# Patient Record
Sex: FEMALE | Race: WHITE | NOT HISPANIC OR LATINO | Employment: FULL TIME | URBAN - METROPOLITAN AREA
[De-identification: names, ages, dates, MRNs, and addresses within clinical notes are randomized per-mention and may not be internally consistent; named-entity substitution may affect disease eponyms.]

---

## 2020-07-02 ENCOUNTER — RECORDS - HEALTHEAST (OUTPATIENT)
Dept: LAB | Facility: CLINIC | Age: 50
End: 2020-07-02

## 2020-07-02 LAB
ALBUMIN SERPL-MCNC: 4.1 G/DL (ref 3.5–5)
ALP SERPL-CCNC: 75 U/L (ref 45–120)
ALT SERPL W P-5'-P-CCNC: 37 U/L (ref 0–45)
ANION GAP SERPL CALCULATED.3IONS-SCNC: 14 MMOL/L (ref 5–18)
AST SERPL W P-5'-P-CCNC: 51 U/L (ref 0–40)
BILIRUB SERPL-MCNC: 0.3 MG/DL (ref 0–1)
BUN SERPL-MCNC: 8 MG/DL (ref 8–22)
CALCIUM SERPL-MCNC: 10.3 MG/DL (ref 8.5–10.5)
CHLORIDE BLD-SCNC: 93 MMOL/L (ref 98–107)
CO2 SERPL-SCNC: 26 MMOL/L (ref 22–31)
CREAT SERPL-MCNC: 0.97 MG/DL (ref 0.6–1.1)
GFR SERPL CREATININE-BSD FRML MDRD: >60 ML/MIN/1.73M2
GLUCOSE BLD-MCNC: 187 MG/DL (ref 70–125)
POTASSIUM BLD-SCNC: 3.5 MMOL/L (ref 3.5–5)
PROT SERPL-MCNC: 7.1 G/DL (ref 6–8)
SODIUM SERPL-SCNC: 133 MMOL/L (ref 136–145)
TSH SERPL DL<=0.005 MIU/L-ACNC: 1.87 UIU/ML (ref 0.3–5)
VIT B12 SERPL-MCNC: 187 PG/ML (ref 213–816)

## 2020-09-16 ENCOUNTER — RECORDS - HEALTHEAST (OUTPATIENT)
Dept: LAB | Facility: CLINIC | Age: 50
End: 2020-09-16

## 2020-09-16 LAB — VIT B12 SERPL-MCNC: 540 PG/ML (ref 213–816)

## 2021-05-25 ENCOUNTER — RECORDS - HEALTHEAST (OUTPATIENT)
Dept: ADMINISTRATIVE | Facility: CLINIC | Age: 51
End: 2021-05-25

## 2021-05-29 ENCOUNTER — RECORDS - HEALTHEAST (OUTPATIENT)
Dept: ADMINISTRATIVE | Facility: CLINIC | Age: 51
End: 2021-05-29

## 2021-05-31 ENCOUNTER — RECORDS - HEALTHEAST (OUTPATIENT)
Dept: ADMINISTRATIVE | Facility: CLINIC | Age: 51
End: 2021-05-31

## 2021-11-16 ENCOUNTER — LAB REQUISITION (OUTPATIENT)
Dept: LAB | Facility: CLINIC | Age: 51
End: 2021-11-16

## 2021-11-16 DIAGNOSIS — Z13.220 ENCOUNTER FOR SCREENING FOR LIPOID DISORDERS: ICD-10-CM

## 2021-11-16 DIAGNOSIS — I10 ESSENTIAL (PRIMARY) HYPERTENSION: ICD-10-CM

## 2021-11-16 DIAGNOSIS — E53.9 VITAMIN B DEFICIENCY, UNSPECIFIED: ICD-10-CM

## 2021-11-16 LAB
ALBUMIN SERPL-MCNC: 4 G/DL (ref 3.5–5)
ALP SERPL-CCNC: 81 U/L (ref 45–120)
ALT SERPL W P-5'-P-CCNC: 36 U/L (ref 0–45)
ANION GAP SERPL CALCULATED.3IONS-SCNC: 13 MMOL/L (ref 5–18)
AST SERPL W P-5'-P-CCNC: 33 U/L (ref 0–40)
BILIRUB SERPL-MCNC: 0.4 MG/DL (ref 0–1)
BUN SERPL-MCNC: 5 MG/DL (ref 8–22)
CALCIUM SERPL-MCNC: 10.3 MG/DL (ref 8.5–10.5)
CHLORIDE BLD-SCNC: 94 MMOL/L (ref 98–107)
CHOLEST SERPL-MCNC: 125 MG/DL
CO2 SERPL-SCNC: 28 MMOL/L (ref 22–31)
CREAT SERPL-MCNC: 0.73 MG/DL (ref 0.6–1.1)
GFR SERPL CREATININE-BSD FRML MDRD: >90 ML/MIN/1.73M2
GLUCOSE BLD-MCNC: 144 MG/DL (ref 70–125)
HDLC SERPL-MCNC: 54 MG/DL
LDLC SERPL CALC-MCNC: 56 MG/DL
POTASSIUM BLD-SCNC: 4.2 MMOL/L (ref 3.5–5)
PROT SERPL-MCNC: 7.2 G/DL (ref 6–8)
SODIUM SERPL-SCNC: 135 MMOL/L (ref 136–145)
TRIGL SERPL-MCNC: 75 MG/DL
VIT B12 SERPL-MCNC: 780 PG/ML (ref 213–816)

## 2021-11-16 PROCEDURE — 82607 VITAMIN B-12: CPT | Performed by: NURSE PRACTITIONER

## 2021-11-16 PROCEDURE — 80053 COMPREHEN METABOLIC PANEL: CPT | Performed by: NURSE PRACTITIONER

## 2021-11-16 PROCEDURE — 80061 LIPID PANEL: CPT | Performed by: NURSE PRACTITIONER

## 2022-06-15 ENCOUNTER — LAB REQUISITION (OUTPATIENT)
Dept: LAB | Facility: CLINIC | Age: 52
End: 2022-06-15

## 2022-06-15 DIAGNOSIS — I10 ESSENTIAL (PRIMARY) HYPERTENSION: ICD-10-CM

## 2022-06-15 LAB
ALBUMIN SERPL-MCNC: 4.5 G/DL (ref 3.5–5)
ALP SERPL-CCNC: 67 U/L (ref 45–120)
ALT SERPL W P-5'-P-CCNC: 28 U/L (ref 0–45)
ANION GAP SERPL CALCULATED.3IONS-SCNC: 14 MMOL/L (ref 5–18)
AST SERPL W P-5'-P-CCNC: 26 U/L (ref 0–40)
BILIRUB SERPL-MCNC: 0.7 MG/DL (ref 0–1)
BUN SERPL-MCNC: 6 MG/DL (ref 8–22)
CALCIUM SERPL-MCNC: 10.7 MG/DL (ref 8.5–10.5)
CHLORIDE BLD-SCNC: 93 MMOL/L (ref 98–107)
CO2 SERPL-SCNC: 28 MMOL/L (ref 22–31)
CREAT SERPL-MCNC: 0.75 MG/DL (ref 0.6–1.1)
GFR SERPL CREATININE-BSD FRML MDRD: >90 ML/MIN/1.73M2
GLUCOSE BLD-MCNC: 127 MG/DL (ref 70–125)
POTASSIUM BLD-SCNC: 3.6 MMOL/L (ref 3.5–5)
PROT SERPL-MCNC: 7.7 G/DL (ref 6–8)
SODIUM SERPL-SCNC: 135 MMOL/L (ref 136–145)

## 2022-06-15 PROCEDURE — 80053 COMPREHEN METABOLIC PANEL: CPT | Performed by: NURSE PRACTITIONER

## 2022-06-24 ENCOUNTER — LAB REQUISITION (OUTPATIENT)
Dept: LAB | Facility: CLINIC | Age: 52
End: 2022-06-24

## 2022-06-24 DIAGNOSIS — I10 ESSENTIAL (PRIMARY) HYPERTENSION: ICD-10-CM

## 2022-06-24 LAB
CALCIUM SERPL-MCNC: 10.4 MG/DL (ref 8.5–10.5)
PTH-INTACT SERPL-MCNC: 25 PG/ML (ref 10–86)

## 2022-06-24 PROCEDURE — 83970 ASSAY OF PARATHORMONE: CPT | Performed by: NURSE PRACTITIONER

## 2022-06-24 PROCEDURE — 82310 ASSAY OF CALCIUM: CPT | Performed by: NURSE PRACTITIONER

## 2022-10-25 ENCOUNTER — LAB REQUISITION (OUTPATIENT)
Dept: LAB | Facility: CLINIC | Age: 52
End: 2022-10-25

## 2022-10-25 DIAGNOSIS — I10 ESSENTIAL (PRIMARY) HYPERTENSION: ICD-10-CM

## 2022-10-25 DIAGNOSIS — Z13.220 ENCOUNTER FOR SCREENING FOR LIPOID DISORDERS: ICD-10-CM

## 2022-10-25 PROCEDURE — 80048 BASIC METABOLIC PNL TOTAL CA: CPT | Performed by: NURSE PRACTITIONER

## 2022-10-25 PROCEDURE — 80061 LIPID PANEL: CPT | Performed by: NURSE PRACTITIONER

## 2022-10-26 LAB
ANION GAP SERPL CALCULATED.3IONS-SCNC: 19 MMOL/L (ref 7–15)
BUN SERPL-MCNC: 7.3 MG/DL (ref 6–20)
CALCIUM SERPL-MCNC: 10.3 MG/DL (ref 8.6–10)
CHLORIDE SERPL-SCNC: 93 MMOL/L (ref 98–107)
CHOLEST SERPL-MCNC: 136 MG/DL
CREAT SERPL-MCNC: 0.73 MG/DL (ref 0.51–0.95)
DEPRECATED HCO3 PLAS-SCNC: 23 MMOL/L (ref 22–29)
GFR SERPL CREATININE-BSD FRML MDRD: >90 ML/MIN/1.73M2
GLUCOSE SERPL-MCNC: 144 MG/DL (ref 70–99)
HDLC SERPL-MCNC: 52 MG/DL
LDLC SERPL CALC-MCNC: 55 MG/DL
NONHDLC SERPL-MCNC: 84 MG/DL
POTASSIUM SERPL-SCNC: 4.4 MMOL/L (ref 3.4–5.3)
SODIUM SERPL-SCNC: 135 MMOL/L (ref 136–145)
TRIGL SERPL-MCNC: 146 MG/DL

## 2023-02-13 ENCOUNTER — LAB REQUISITION (OUTPATIENT)
Dept: LAB | Facility: CLINIC | Age: 53
End: 2023-02-13

## 2023-02-13 DIAGNOSIS — J02.9 ACUTE PHARYNGITIS, UNSPECIFIED: ICD-10-CM

## 2023-02-13 PROCEDURE — 87081 CULTURE SCREEN ONLY: CPT | Performed by: FAMILY MEDICINE

## 2023-02-15 LAB — BACTERIA SPEC CULT: NORMAL

## 2023-06-14 ENCOUNTER — LAB REQUISITION (OUTPATIENT)
Dept: LAB | Facility: CLINIC | Age: 53
End: 2023-06-14

## 2023-06-14 DIAGNOSIS — R00.0 TACHYCARDIA, UNSPECIFIED: ICD-10-CM

## 2023-06-14 LAB
ERYTHROCYTE [DISTWIDTH] IN BLOOD BY AUTOMATED COUNT: 12.8 % (ref 10–15)
HCT VFR BLD AUTO: 40.6 % (ref 35–47)
HGB BLD-MCNC: 14 G/DL (ref 11.7–15.7)
MCH RBC QN AUTO: 33.3 PG (ref 26.5–33)
MCHC RBC AUTO-ENTMCNC: 34.5 G/DL (ref 31.5–36.5)
MCV RBC AUTO: 97 FL (ref 78–100)
PLATELET # BLD AUTO: 194 10E3/UL (ref 150–450)
RBC # BLD AUTO: 4.2 10E6/UL (ref 3.8–5.2)
TSH SERPL DL<=0.005 MIU/L-ACNC: 1.29 UIU/ML (ref 0.3–4.2)
WBC # BLD AUTO: 4.6 10E3/UL (ref 4–11)

## 2023-06-14 PROCEDURE — 85027 COMPLETE CBC AUTOMATED: CPT | Performed by: NURSE PRACTITIONER

## 2023-06-14 PROCEDURE — 84443 ASSAY THYROID STIM HORMONE: CPT | Performed by: NURSE PRACTITIONER

## 2023-07-03 ENCOUNTER — LAB REQUISITION (OUTPATIENT)
Dept: LAB | Facility: CLINIC | Age: 53
End: 2023-07-03

## 2023-07-03 DIAGNOSIS — I10 ESSENTIAL (PRIMARY) HYPERTENSION: ICD-10-CM

## 2023-07-03 PROCEDURE — 80048 BASIC METABOLIC PNL TOTAL CA: CPT | Performed by: NURSE PRACTITIONER

## 2023-07-04 LAB
ANION GAP SERPL CALCULATED.3IONS-SCNC: 16 MMOL/L (ref 7–15)
BUN SERPL-MCNC: 15.4 MG/DL (ref 6–20)
CALCIUM SERPL-MCNC: 10.5 MG/DL (ref 8.6–10)
CHLORIDE SERPL-SCNC: 82 MMOL/L (ref 98–107)
CREAT SERPL-MCNC: 1.2 MG/DL (ref 0.51–0.95)
DEPRECATED HCO3 PLAS-SCNC: 26 MMOL/L (ref 22–29)
GFR SERPL CREATININE-BSD FRML MDRD: 54 ML/MIN/1.73M2
GLUCOSE SERPL-MCNC: 168 MG/DL (ref 70–99)
POTASSIUM SERPL-SCNC: 3.7 MMOL/L (ref 3.4–5.3)
SODIUM SERPL-SCNC: 124 MMOL/L (ref 136–145)

## 2023-07-13 ENCOUNTER — LAB REQUISITION (OUTPATIENT)
Dept: LAB | Facility: CLINIC | Age: 53
End: 2023-07-13

## 2023-07-13 DIAGNOSIS — I10 ESSENTIAL (PRIMARY) HYPERTENSION: ICD-10-CM

## 2023-07-13 LAB
ANION GAP SERPL CALCULATED.3IONS-SCNC: 15 MMOL/L (ref 7–15)
BUN SERPL-MCNC: 6.6 MG/DL (ref 6–20)
CALCIUM SERPL-MCNC: 11 MG/DL (ref 8.6–10)
CHLORIDE SERPL-SCNC: 78 MMOL/L (ref 98–107)
CREAT SERPL-MCNC: 0.78 MG/DL (ref 0.51–0.95)
DEPRECATED HCO3 PLAS-SCNC: 30 MMOL/L (ref 22–29)
GFR SERPL CREATININE-BSD FRML MDRD: 90 ML/MIN/1.73M2
GLUCOSE SERPL-MCNC: 137 MG/DL (ref 70–99)
POTASSIUM SERPL-SCNC: 3.6 MMOL/L (ref 3.4–5.3)
SODIUM SERPL-SCNC: 123 MMOL/L (ref 136–145)

## 2023-07-13 PROCEDURE — 80048 BASIC METABOLIC PNL TOTAL CA: CPT | Performed by: NURSE PRACTITIONER

## 2023-09-19 ENCOUNTER — LAB REQUISITION (OUTPATIENT)
Dept: LAB | Facility: CLINIC | Age: 53
End: 2023-09-19

## 2023-09-19 DIAGNOSIS — I10 ESSENTIAL (PRIMARY) HYPERTENSION: ICD-10-CM

## 2023-09-19 DIAGNOSIS — E78.2 MIXED HYPERLIPIDEMIA: ICD-10-CM

## 2023-09-19 PROCEDURE — 80048 BASIC METABOLIC PNL TOTAL CA: CPT | Performed by: FAMILY MEDICINE

## 2023-09-19 PROCEDURE — 80061 LIPID PANEL: CPT | Performed by: FAMILY MEDICINE

## 2023-09-20 LAB
ANION GAP SERPL CALCULATED.3IONS-SCNC: 14 MMOL/L (ref 7–15)
BUN SERPL-MCNC: 11.2 MG/DL (ref 6–20)
CALCIUM SERPL-MCNC: 10 MG/DL (ref 8.6–10)
CHLORIDE SERPL-SCNC: 87 MMOL/L (ref 98–107)
CHOLEST SERPL-MCNC: 189 MG/DL
CREAT SERPL-MCNC: 0.83 MG/DL (ref 0.51–0.95)
DEPRECATED HCO3 PLAS-SCNC: 27 MMOL/L (ref 22–29)
EGFRCR SERPLBLD CKD-EPI 2021: 83 ML/MIN/1.73M2
GLUCOSE SERPL-MCNC: 154 MG/DL (ref 70–99)
HDLC SERPL-MCNC: 62 MG/DL
LDLC SERPL CALC-MCNC: 102 MG/DL
NONHDLC SERPL-MCNC: 127 MG/DL
POTASSIUM SERPL-SCNC: 3.6 MMOL/L (ref 3.4–5.3)
SODIUM SERPL-SCNC: 128 MMOL/L (ref 136–145)
TRIGL SERPL-MCNC: 127 MG/DL

## 2024-02-21 ENCOUNTER — TRANSFERRED RECORDS (OUTPATIENT)
Dept: HEALTH INFORMATION MANAGEMENT | Facility: CLINIC | Age: 54
End: 2024-02-21
Payer: COMMERCIAL

## 2024-02-27 ENCOUNTER — LAB REQUISITION (OUTPATIENT)
Dept: LAB | Facility: CLINIC | Age: 54
End: 2024-02-27

## 2024-02-27 DIAGNOSIS — E11.9 TYPE 2 DIABETES MELLITUS WITHOUT COMPLICATIONS (H): ICD-10-CM

## 2024-02-27 DIAGNOSIS — E78.2 MIXED HYPERLIPIDEMIA: ICD-10-CM

## 2024-02-27 LAB
ERYTHROCYTE [DISTWIDTH] IN BLOOD BY AUTOMATED COUNT: 12.6 % (ref 10–15)
HCT VFR BLD AUTO: 43.3 % (ref 35–47)
HGB BLD-MCNC: 15 G/DL (ref 11.7–15.7)
MCH RBC QN AUTO: 33.6 PG (ref 26.5–33)
MCHC RBC AUTO-ENTMCNC: 34.6 G/DL (ref 31.5–36.5)
MCV RBC AUTO: 97 FL (ref 78–100)
PLATELET # BLD AUTO: 160 10E3/UL (ref 150–450)
RBC # BLD AUTO: 4.47 10E6/UL (ref 3.8–5.2)
WBC # BLD AUTO: 6 10E3/UL (ref 4–11)

## 2024-02-27 PROCEDURE — 80048 BASIC METABOLIC PNL TOTAL CA: CPT | Performed by: FAMILY MEDICINE

## 2024-02-27 PROCEDURE — 85027 COMPLETE CBC AUTOMATED: CPT | Performed by: FAMILY MEDICINE

## 2024-02-27 PROCEDURE — 80061 LIPID PANEL: CPT | Performed by: FAMILY MEDICINE

## 2024-02-28 ENCOUNTER — TRANSFERRED RECORDS (OUTPATIENT)
Dept: HEALTH INFORMATION MANAGEMENT | Facility: CLINIC | Age: 54
End: 2024-02-28

## 2024-02-28 LAB
ANION GAP SERPL CALCULATED.3IONS-SCNC: 15 MMOL/L (ref 7–15)
BUN SERPL-MCNC: 11.6 MG/DL (ref 6–20)
CALCIUM SERPL-MCNC: 10.4 MG/DL (ref 8.6–10)
CHLORIDE SERPL-SCNC: 94 MMOL/L (ref 98–107)
CHOLEST SERPL-MCNC: 243 MG/DL
CREAT SERPL-MCNC: 0.81 MG/DL (ref 0.51–0.95)
DEPRECATED HCO3 PLAS-SCNC: 25 MMOL/L (ref 22–29)
EGFRCR SERPLBLD CKD-EPI 2021: 86 ML/MIN/1.73M2
FASTING STATUS PATIENT QL REPORTED: ABNORMAL
GLUCOSE SERPL-MCNC: 181 MG/DL (ref 70–99)
HDLC SERPL-MCNC: 56 MG/DL
LDLC SERPL CALC-MCNC: 160 MG/DL
NONHDLC SERPL-MCNC: 187 MG/DL
POTASSIUM SERPL-SCNC: 3.9 MMOL/L (ref 3.4–5.3)
SODIUM SERPL-SCNC: 134 MMOL/L (ref 135–145)
TRIGL SERPL-MCNC: 135 MG/DL

## 2024-03-21 ENCOUNTER — TRANSCRIBE ORDERS (OUTPATIENT)
Dept: OTHER | Age: 54
End: 2024-03-21

## 2024-03-21 ENCOUNTER — PATIENT OUTREACH (OUTPATIENT)
Dept: ONCOLOGY | Facility: CLINIC | Age: 54
End: 2024-03-21
Payer: COMMERCIAL

## 2024-03-21 DIAGNOSIS — C50.412 MALIGNANT NEOPLASM OF UPPER-OUTER QUADRANT OF LEFT BREAST IN FEMALE, ESTROGEN RECEPTOR POSITIVE (H): Primary | ICD-10-CM

## 2024-03-21 DIAGNOSIS — Z17.0 MALIGNANT NEOPLASM OF UPPER-OUTER QUADRANT OF LEFT BREAST IN FEMALE, ESTROGEN RECEPTOR POSITIVE (H): Primary | ICD-10-CM

## 2024-03-21 NOTE — PROGRESS NOTES
New Patient Oncology Nurse Navigator Note     Referring provider: Dr Ghazal Keane     Referring Clinic/Organization: [facility]     Referred to (specialty:) Radiation Oncology     Date Referral Received: March 21, 2024     Evaluation for:  C50.412, Z17.0 (ICD-10-CM) - Malignant neoplasm of upper-outer quadrant of left breast in female, estrogen receptor positive (H)     Clinical History (per Nurse review of records provided):      Patient had bilateral screening mammograms on 1/21/24 and calcifications were identified at the 3 o'clock position at middle depth of the left breast. Diagnostic imaging followed on 1/25. Left Digital Diagnostic Mammogram was performed with spot magnification views. When performed, computer-aided detection was used in the interpretation of this study.   In the left breast at 2:00 middle depth, there are grouped fine linear calcifications measuring 13 x 10 x 8 mm. There is subtle associated distortion in this area. Further evaluation with ultrasound was pursued. Extensive ultrasound of the left lateral breast was pursued by the radiologist and technologist. No definite sonographic correlate was identified for the mammographic finding.     1/25/24 - Case: [id number]     A) LEFT BREAST, UPPER OUTER QUADRANT, 2:00, STEREOTACTIC-GUIDED CORE BIOPSY:   1. Invasive ductal carcinoma with micropapillary features (see comment)      a. Ely grade: II of III; Osman score: 6 of 9      b. Angio-lymphatic invasion: Present      c. Associated DCIS: Present      d. Subtype: Cribriform       e. Grade of DCIS: 2 of 3      f. Calcifications: Present in invasive carcinoma, DCIS, and benign breast tissue      g. Perineural invasion: Present   2. Breast Ancillary Testing:        a. Hormone Receptors:             Estrogen receptor: Positive (99%, strong staining)             Progesterone receptor: Positive (8%, moderate staining)       b. HER2 by IHC: Equivocal (2+ by manual morphometry)          HER2  by FISH: Negative             HER2/CEP17 ratio: 1.21             HER2 signals/cell: 3.37             CEP17 signals/cell: 2.78       c. Ki-67: 14%   Pathology review (as applicable)    2/14/2024 - Patient met with Dr. Ghazal Keane and proceeded with surgery.    2/21/24 - Bilateral breast MRI  IMPRESSION:   1. A small area of linear, nonmass enhancement is noted along the superior/medial margin of the biopsy cavity/hematoma, consistent with the biopsy-proven invasive ductal carcinoma. By MRI, this measures slightly larger than mammogram, with nonmass enhancement measuring 24 x 9 x 6 mm.   2. Small post biopsy hematoma at site of stereotactic biopsy.   3. No suspicious findings for malignancy in the right breast or either axilla.   Right Breast: BI-RADS CATEGORY: 1 -  NEGATIVE.   Left Breast: BI-RADS CATEGORY: 6 - Known Biopsy-Proven Malignancy-Appropriate Action Should Be Taken.   Imaging needed    3/18/24 Case: [id number]  A) LEFT BREAST, LUMPECTOMY:   1. Invasive ductal carcinoma, Osman grade II of III, with micropapillary features       a. Size: 12 mm       b. Core biopsy site is associated with tumor   2. Ductal carcinoma in situ (DCIS), nuclear grade 2, Cribriform and Solid type   3. Margins:       a. Invasive carcinoma is 1 mm from the posterior margin       b. DCIS is 5 mm from the posterior margin   4. Breast Ancillary Testing: Performed on prior case ([id number])       a. Hormone Receptors:             Estrogen receptor: Positive (99%, strong staining)             Progesterone receptor: Positive (8%, moderate staining)       b. HER2 by IHC: Equivocal (2+ by manual morphometry)          HER2 by FISH: Negative             HER2/CEP17 ratio: 1.21             HER2 signals/cell: 3.37             CEP17 signals/cell: 2.78       c. Ki-67: 14%   5. Extensive lymphovascular invasion present     B) LEFT AXILLARY SENTINEL LYMPH NODE, 1, BIOPSY:   1. Positive for malignancy in 1 of 2 lymph nodes (1/2)      a. Size  of largest metastatic focus: 0.23 mm      b. Extracapsular extension: Absent     C) LEFT BREAST, ANTERIOR MARGIN, RE-EXCISION WITH MARGIN EVALUATION:   1. Lymphovascular invasion present   2. Negative for invasive carcinoma and ductal carcinoma in situ     D) LEFT BREAST, LATERAL MARGIN, RE-EXCISION WITH MARGIN EVALUATION: Negative for atypia and malignancy     Pathology review (as applicable)    She is scheduled to see Dr. Caio Colon at [facility] on 4/3/24. Prescription for letrozole already ordered.     Patient resides in [place], [place]    RADIATION ONCOLOGY APPOINTMENT CONFIRMATION FORM faxed to referring provider's office on 3/22 at 14:22    3/22 11:09 - Telephoned and spoke with patient to assist in scheduling radiation oncology consult. Explained my role and purpose for the call. She will be seeing medical oncology on 4/3. No reexcision or further surgery is planned. Writer received referral, reviewed for appropriate plan, and call warm transferred to New Patient Scheduling for completion.    Records Location: Care Everywhere, Media, and See Bookmarked material    Records Needed:  Path reports-biopsy and surgery (as applicable)  Pathology reviews (as applicable)  Med onc notes, OP note, surgeons note (as applicable)  Genetic results (as applicable)  Echo or MUGA results (as applicable)  Chemotherapy summary(as applicable)  Breast imaging for past 5 years No imaging on PACS as of 3/21

## 2024-03-22 ENCOUNTER — PRE VISIT (OUTPATIENT)
Dept: RADIATION ONCOLOGY | Facility: CLINIC | Age: 54
End: 2024-03-22
Payer: COMMERCIAL

## 2024-03-22 NOTE — TELEPHONE ENCOUNTER
Imaging Received  2024 1:57 PM ABT   Action: Breast Images from [facility] received and email sent to  Imaging team to resolve breast images to PACS.     Records Needed:  Path reports-biopsy and surgery (as applicable)  Pathology reviews (as applicable)  Med onc notes, OP note, surgeons note (as applicable)  Genetic results (as applicable)  Echo or MUGA results (as applicable)  Chemotherapy summary(as applicable)  Breast imaging for past 5 years No imaging on PACS as of 3/21    MEDICAL RECORDS REQUEST   Radiation Oncology  [address] [place] [id number]  Fax: [phone]          FUTURE VISIT INFORMATION                                                   Marlyn GEETA Kemp, : 1970 scheduled for future visit at [facility] Radiation Oncology    RECORDS REQUESTED FOR VISIT                                                     BREAST     OFFICE NOTE from surgeon/plastic surgeon -[facility]: Dr. Ghazal Keane   CHEMOTHERAPY NOTES/LOG     OPERATIVE/BREAST BIOPSY REPORTS [facility]: Left breast lumpectomy, sentinel lymph node biopsy     09: REDUCTION MAMMOPLASTY    MEDICATION LIST [facility]    LABS     PATHOLOGY REPORTS Reports in -[facility]: [id number]  24: [id number]   ANYTHING RELATED TO DIAGNOSIS [facility] Most recent 24   IMAGING (NEED IMAGES & REPORT)     MRI PACS Sub [facility]: MR Breast   MAMMO/SURGICAL BREAST IMGS/SPECIMEN RADIOGRAPH PACS [facility]:  24-20: Mammo  24, 24: XR Breast   ULTRASOUND PACS [facility]:  24-24: US Breast

## 2024-04-08 ENCOUNTER — HOSPITAL ENCOUNTER (OUTPATIENT)
Dept: CT IMAGING | Facility: CLINIC | Age: 54
Discharge: HOME | End: 2024-04-08
Attending: NURSE PRACTITIONER | Admitting: NURSE PRACTITIONER
Payer: COMMERCIAL

## 2024-04-08 DIAGNOSIS — E78.2 MIXED HYPERLIPIDEMIA: ICD-10-CM

## 2024-04-08 LAB
CV CALCIUM SCORE AGATSTON LM: 0
CV CALCIUM SCORING AGATSON LAD: 555
CV CALCIUM SCORING AGATSTON CX: 82
CV CALCIUM SCORING AGATSTON RCA: 598
CV CALCIUM SCORING AGATSTON TOTAL: 1235

## 2024-04-08 PROCEDURE — 75571 CT HRT W/O DYE W/CA TEST: CPT | Mod: 26 | Performed by: STUDENT IN AN ORGANIZED HEALTH CARE EDUCATION/TRAINING PROGRAM

## 2024-04-08 PROCEDURE — 75571 CT HRT W/O DYE W/CA TEST: CPT

## 2024-04-11 ENCOUNTER — OFFICE VISIT (OUTPATIENT)
Dept: RADIATION ONCOLOGY | Facility: HOSPITAL | Age: 54
End: 2024-04-11
Attending: RADIOLOGY
Payer: COMMERCIAL

## 2024-04-11 VITALS
OXYGEN SATURATION: 98 % | SYSTOLIC BLOOD PRESSURE: 132 MMHG | WEIGHT: 189.2 LBS | DIASTOLIC BLOOD PRESSURE: 78 MMHG | BODY MASS INDEX: 28.02 KG/M2 | RESPIRATION RATE: 16 BRPM | TEMPERATURE: 98.4 F | HEART RATE: 81 BPM | HEIGHT: 69 IN

## 2024-04-11 DIAGNOSIS — C50.412 MALIGNANT NEOPLASM OF UPPER-OUTER QUADRANT OF LEFT BREAST IN FEMALE, ESTROGEN RECEPTOR POSITIVE (H): Primary | ICD-10-CM

## 2024-04-11 DIAGNOSIS — Z17.0 MALIGNANT NEOPLASM OF UPPER-OUTER QUADRANT OF LEFT BREAST IN FEMALE, ESTROGEN RECEPTOR POSITIVE (H): Primary | ICD-10-CM

## 2024-04-11 PROCEDURE — 77263 THER RADIOLOGY TX PLNG CPLX: CPT | Performed by: RADIOLOGY

## 2024-04-11 PROCEDURE — 99205 OFFICE O/P NEW HI 60 MIN: CPT | Mod: 25 | Performed by: RADIOLOGY

## 2024-04-11 PROCEDURE — 99214 OFFICE O/P EST MOD 30 MIN: CPT | Performed by: RADIOLOGY

## 2024-04-11 RX ORDER — METFORMIN HYDROCHLORIDE 1000 MG/1
1000 TABLET, FILM COATED, EXTENDED RELEASE ORAL DAILY
COMMUNITY
Start: 2023-09-19

## 2024-04-11 RX ORDER — IVERMECTIN 10 MG/G
CREAM TOPICAL DAILY
COMMUNITY
Start: 2024-03-15

## 2024-04-11 RX ORDER — ATORVASTATIN CALCIUM 40 MG/1
1 TABLET, FILM COATED ORAL AT BEDTIME
COMMUNITY

## 2024-04-11 RX ORDER — AZELAIC ACID 0.15 G/G
GEL TOPICAL 2 TIMES DAILY
COMMUNITY

## 2024-04-11 RX ORDER — ONDANSETRON 4 MG/1
1 TABLET, FILM COATED ORAL EVERY 6 HOURS PRN
COMMUNITY
Start: 2024-04-01 | End: 2024-08-29

## 2024-04-11 RX ORDER — LISINOPRIL 2.5 MG/1
2.5 TABLET ORAL DAILY
COMMUNITY
End: 2024-08-29 | Stop reason: ALTCHOICE

## 2024-04-11 RX ORDER — AMLODIPINE BESYLATE 10 MG/1
10 TABLET ORAL DAILY
COMMUNITY
Start: 2023-09-19

## 2024-04-11 RX ORDER — ACETAMINOPHEN 500 MG
1000 TABLET ORAL
COMMUNITY
Start: 2024-03-18

## 2024-04-11 RX ORDER — METOPROLOL SUCCINATE 50 MG/1
50 TABLET, EXTENDED RELEASE ORAL DAILY
COMMUNITY
Start: 2024-02-27 | End: 2024-08-29 | Stop reason: ALTCHOICE

## 2024-04-11 RX ORDER — LOSARTAN POTASSIUM 100 MG/1
1 TABLET ORAL DAILY
COMMUNITY

## 2024-04-11 RX ORDER — BUPROPION HYDROCHLORIDE 150 MG/1
150 TABLET ORAL EVERY MORNING
COMMUNITY

## 2024-04-11 RX ORDER — LETROZOLE 2.5 MG/1
2.5 TABLET, FILM COATED ORAL DAILY
COMMUNITY
Start: 2024-02-14 | End: 2024-09-24

## 2024-04-11 ASSESSMENT — PAIN SCALES - GENERAL: PAINLEVEL: NO PAIN (0)

## 2024-04-11 NOTE — PROGRESS NOTES
"Considerations for radiation treatment   Pregnancy status: Female with hysterectomy   Implanted Cardiac Devices: No   Any previous radiation therapy: No  Denies any autoimmune diseases      Oncology Rooming Note    April 11, 2024 11:27 AM   Marlyn Martel is a 54 year old female who presents for:    Chief Complaint   Patient presents with    Oncology Clinic Visit    Breast Cancer     Rad Onc consult     Initial Vitals: /78   Pulse 81   Temp 98.4  F (36.9  C)   Resp 16   Ht 1.74 m (5' 8.5\")   Wt 85.8 kg (189 lb 3.2 oz)   SpO2 98%   BMI 28.35 kg/m   Estimated body mass index is 28.35 kg/m  as calculated from the following:    Height as of this encounter: 1.74 m (5' 8.5\").    Weight as of this encounter: 85.8 kg (189 lb 3.2 oz). Body surface area is 2.04 meters squared.  No Pain (0) Comment: Data Unavailable   No LMP recorded.  Allergies reviewed: Yes  Medications reviewed: Yes    Medications: Medication refills not needed today.  Pharmacy name entered into [facility]: [facility] - [place] [id number]    Frailty Screening:   Is the patient here for a new oncology consult visit in cancer care? 2. No      Clinical concerns: Feeling well since surgery, occasional \"zingers\" in breast but tolerable.   Dr. Hickey was notified.      Radiation Therapy Patient Education    Person involved with teaching: Patient    Patient educational needs for self management of treatment-related side effects assessment completed.  [facility] Patient Ed tab contains Patient Learning Assessment    Education Materials Given  Radiation Treatment For Cancer, Radiation Therapy to the Breast Guidelines, Oncology Supportive Care Services , Welcome Letter, Insurance PA Information, and [facility] handout    Educational Topics Discussed  Side effects expected, Skin care, and Activity    Response To Teaching  Verbalizes understanding    GYN Only  Vaginal Dilator-given and educated: N/A    Referrals sent: None    Chemotherapy?  Yes: " unsure yet, states Oncotype pending. She is on Letrazole since 2/15/2024        Paola Melendez RN

## 2024-04-11 NOTE — LETTER
4/11/2024         RE: Marlyn Martel  [address]  [place] [address]        Dear Colleague,    Thank you for referring your patient, Marlyn Martel, to the [facility] RADIATION ONCOLOGY [place]. Please see a copy of my visit note below.    [facility] Radiation Oncology Consult Note     Patient: Marlyn Martel  MRN: [id number]  Date of Service: 04/11/2024          Ghazal Keane MD  [address] [address]  [address]  [place] [address]       Dear Dr. Keane:    Thank you very much for referring this patient for consideration of radiotherapy. As you know Ms. Martel is a 54 year old female with a diagnosis of left breast cancer, pathologic stage [id number](sn)M0, ER/ID positive, HER2 negative, status post lumpectomy and sentinel lymph node resection with negative margin and closest margin measured 1 mm posterior for invasive cancer and a 5 mm posterior for DCIS.  1/2 removed sentinel lymph node showed 0.23 millimeter invasive cancer with no evidence of YUSUF.  Oncotype DX score is pending.  Patient is referred to radiation oncology for evaluation and discussion of possible postop radiation therapy.    HISTORY OF PRESENT ILLNESS:   Ms. Martel is a 54 year old female who has been in her usual state of health until recently.  The patient presented with abnormal finding by routine screening mammogram for which she was not seeking further evaluation.  The mammogram followed by ultrasound showed 13 x 10 x 8 mm removed 5 mm calcification in the left breast at 2:00 middle depth position suspicious for malignancy.  The patient underwent stereotactic breast biopsy on 1/25/2024.  The pathology reviewed invasive ductal carcinoma with some micro papillary features, Osman grade 2, ER/ID positive and HER2 negative.  Patient underwent MRI breast on 2/21/2024 with finding consistent with biopsy-proven breast cancer with no evidence of axillary lymph node adenopathy.  After  discussed with patient about various treatment options, the patient elected proceed with breast preservation protocol as her treatment choice and underwent lumpectomy and sentinel lymph node resection on 3/18/2024.  The pathology showed 12 mm invasive ductal carcinoma, Madison grade 2 with associated DCIS.  The margins was negative with the closest margin measured 1 mm posterior for invasive cancer and a 5 mm posterior for DCIS.  1/2 removed sentinel lymph node showed evidence of 0.23 mm metastatic focus with no evidence of YUSUF.  Her Oncotype DX score is pending.  Patient has been recovering well since surgery.  She saw Dr. Caio Colon, medical oncology and adjuvant hormonal therapy was recommended.  The patient is referred to radiation oncology for evaluation and discussion of postop radiation therapy.    CHEMOTHERAPY HISTORY: Concurrent Chemotherapy: No    RADIATION THERAPY HISTORY: Prior Radiation: No    IMPLANTED CARDIAC DEVICE: none     PREGNANCY: The patient is informed not to be pregnant during the radiation therapy.  She is post menopausal.    Current Outpatient Medications   Medication Sig Dispense Refill     acetaminophen (TYLENOL) 500 MG tablet Take 1,000 mg by mouth       amLODIPine (NORVASC) 10 MG tablet Take 10 mg by mouth daily       letrozole (FEMARA) 2.5 MG tablet Take 2.5 mg by mouth daily       metFORMIN modified (GLUMETZA) 1000 MG 24 hr tablet Take 1,000 mg by mouth daily       metoprolol succinate ER (TOPROL XL) 50 MG 24 hr tablet Take 50 mg by mouth daily       ondansetron (ZOFRAN) 4 MG tablet Take 1 tablet by mouth every 6 hours as needed       SOOLANTRA 1 % cream Apply topically daily       atorvastatin (LIPITOR) 40 MG tablet Take 1 tablet by mouth at bedtime       azelaic acid (FINACIA) 15 % external gel Apply topically 2 times daily       buPROPion (WELLBUTRIN XL) 150 MG 24 hr tablet Take 150 mg by mouth every morning       lisinopril (ZESTRIL) 2.5 MG tablet Take 2.5 mg by mouth daily        losartan (COZAAR) 100 MG tablet Take 1 tablet by mouth daily       Multiple Vitamins-Minerals (MULTIVITAL PO) Take by mouth daily       vitamin B-12 (CYANOCOBALAMIN) 100 MCG tablet Take 100 mcg by mouth daily       Past Medical History:   Diagnosis Date     Breast cancer (H)      Depression      Hypertension      Mixed hyperlipidemia      Past Surgical History:   Procedure Laterality Date     HYSTERECTOMY      age 29     LUMPECTOMY BREAST Left 03/18/2024     [place] REDUCTION OF LARGE BREAST Bilateral      Allergies   Allergen Reactions     Beta Adrenergic Blockers Swelling     edema     Pneumococcal Vaccine Swelling     Sulfa Antibiotics      Family History   Problem Relation Age of Onset     Breast Cancer Mother      Liver Disease Father      Colon Cancer Maternal Grandmother      Leukemia Paternal Grandfather      Cancer Sister      Breast Cancer Maternal Aunt      Cancer Maternal Aunt      Liver Disease Paternal Uncle      Esophageal Cancer Maternal Uncle      Social History     Socioeconomic History     Marital status:      Spouse name: Not on file     Number of children: Not on file     Years of education: Not on file     Highest education level: Not on file   Occupational History     Not on file   Tobacco Use     Smoking status: Every Day     Current packs/day: 0.50     Types: Cigarettes     Smokeless tobacco: Never   Substance and Sexual Activity     Alcohol use: Yes     Comment: holidays     Drug use: Never     Sexual activity: Not on file   Other Topics Concern     Not on file   Social History Narrative     Not on file     Social Determinants of Health     Financial Resource Strain: High Risk (1/1/2022)    [facility]    Financial Resource Strain      Difficulty of Paying Living Expenses: Not on file      Difficulty of Paying Living Expenses: Not on file   Food Insecurity: Not on file   Transportation Needs:  Not on file   Physical Activity: Not on file   Stress: Not on file   Social Connections: Unknown (3/27/2024)    [facility], [facility]    Social Connections      Frequency of Communication with Friends and Family: Not on file   Interpersonal Safety: Not on file   Housing Stability: Not on file        REVIEW OF SYMPTOMS:  A full 14-point review of systems was performed. Pertinent findings are noted in the HPI.    General  Constitutional  Constitutional (WDL): All constitutional elements are within defined limits  EENT  Eye Disorders  Eye Disorder (WDL): All eye disorder elements are within defined limits  Ear Disorders  Ear Disorder (WDL): All ear disorder elements are within defined limits  Respiratory  Respiratory  Respiratory (WDL): All respiratory elements are within defined limits  Cardiovascular  Cardiovascular  Cardiovascular (WDL): All cardiovascular elements are within defined limits  Gastrointestinal  Gastrointestinal  Gastrointestinal (WDL): All gastrointestinal elements are within defined limits  Musculoskeletal  Musculoskeletal and Connective Tissue Disorders  Musculoskeletal & Connective (WDL): All musculoskeletal & connective elements are within defined limits  Integumentary  Integumentary  Integumentary (WDL): All integumentary elements are within defined limits  Neurological  Neurosensory  Neurosensory (WDL): All neurosensory elements are within defined limits  Genitourinary/Reproductive  Genitourinary  Genitourinary (WDL): All genitourinary elements are within defined limits  Lymphatic  Lymph System Disorders  Lymph (WDL): All lymph elements are within defined limits  Pain  Pain Score: No Pain (0)  AUA Assessment                                                              Accompanied by  Accompanied By: self only    ECOG Status: 0    Imaging: Reviewed    Pathology: Reviewed    Objective:      PHYSICAL EXAMINATION:   "  /78   Pulse 81   Temp 98.4  F (36.9  C)   Resp 16   Ht 1.74 m (5' 8.5\")   Wt 85.8 kg (189 lb 3.2 oz)   SpO2 98%   BMI 28.35 kg/m      Gen: Alert, in NAD  Eyes: PERRL, EOMI, sclera anicteric  Neck: Supple, full ROM, no LAD  Chest: The breasts and nipples are symmetrical bilaterally.  There is no evidence of nipple discharge.  There is no palpable lump or mass bilaterally in the breast, axillary, and supraclavicular region.  There is well-healed surgical scar in the left breast consistent with a recent history of surgery.  Pulm: No wheezing, stridor or respiratory distress  CV: Well-perfused, no cyanosis, no pedal edema  Back: No step-offs or pain to palpation along the thoracolumbar spine  Rectal: Deferred  : Deferred  Musculoskeletal: Normal muscle bulk and tone  Skin: Normal color and turgor  Neurologic: A/Ox3, CN II-XII intact, normal gait and station  Psychiatric: Appropriate mood and affect    Intent of Therapy: Curative    We recommend adjuvant radiation as part of her breast conserving therapy to decrease her chance of local recurrence to the single digits. ROM stretches and skin care were discussed with the patient. She understands that these maneuvers need to continue for 6 months following completion of radiation as skin and muscle fibrosis continue to form for weeks to months following completion of therapy.      Side effects that may occur during or within weeks after radiation therapy    Fatigue and general weakness  Darkening, irritation, itchiness, redness, dryness, erythema, peeling, scabbing, ulceration and contraction of the skin of the breast and chest  Swelling of the breast  Loss of armpit hair  Lung irritation  Decrease in appetite    Side effects that may occur months or years after radiation therapy    Development of another tumor or cancer  Thickening, telangiectasias (development of spider like blood vessels in the skin) and ulceration of the skin of the breast and " chest  Firming, fibrosis (scar tissue), fat necrosis, and distortion of the breast  Poor healing after a trauma or surgery in the irradiated area  Nerve damage resulting in loss of arm strength and sensation  Coronary artery blockage causing angina pain or a heart attack  Lung inflammation of fibrosis causing cough, fever and shortness of breath  Fracture of the ribs  Swellingof an arm and hand    The risks, benefits and alternatives to radiation therapy were outlined with the patient. All questions were answered and a consent was signed.     Impression     Left breast cancer, pathologic stage [id number](sn)M0, ER/ND positive, HER2 negative, status post lumpectomy and sentinel lymph node resection with negative margin and closest margin measured 1 mm posterior for invasive cancer and a 5 mm posterior for DCIS.  1/2 removed sentinel lymph node showed 0.23 millimeter invasive cancer with no evidence of YUSUF.  Oncotype DX score is pending.     Assessment & Plan:     I have personally reviewed her upcoming medical record today.  I have also reviewed her most recent radiology study including mammogram.  The possible treatment options including surgery, systemic therapy, and radiation therapy has been discussed with patient in detail and at great lengths.  The possible risks and side effects of radiation therapy has also been explained to the patient.  The NCCN guideline for breast cancer treatment recommendation has also been reviewed with the patient.  Questions are answered to patient satisfaction.    The possibility of participating [facility] MA.39 clinical phase 3 trials has been discussed with patient.  This is a phase 3 clinical trial which randomized patient to have postoperation therapy to the breast/chest wall with or without regional lymph node in the field.    The patient is not interested to participate in clinical trial at this time.  She wished to proceed with postop radiation therapy as her treatment choice for her  "breast cancer.  We will wait for the Oncotype DX score before proceed with radiation therapy.  Patient is informed to contact radiation oncology in the near future to set up time for follow-up and simulation once she had Oncotype DX score.  I plan to give her radiation therapy to a total dose of 4256 cGy in 16 treatments targeted to the left breast and regional lymph nodes using a high tangent technique followed by additional 1000 cGy in 4 treatment boost to the primary tumor bed.    Again, thank you very much for the referral and allowing me to participate in the care of this patient.  If you have any questions or concerns about this consultation, please do not hesitate to call.  I spent approximately 45 minutes today with the patient and 80% time was used for counseling.      Sincerely,          Jackie Hickey MD, PhD  Department of Radiation Oncology   [facility] Radiation Oncology  Tel: [phone]  Cell: [phone]    [facility]  [address] [place] [address]     [facility]   [address] [place] [address]    CC:  Patient Care Team:  No Ref-Primary, Physician as PCP - General  Jackie Hickey MD as MD (Radiation Oncology)        Considerations for radiation treatment   Pregnancy status: Female with hysterectomy   Implanted Cardiac Devices: No   Any previous radiation therapy: No  Denies any autoimmune diseases      Oncology Rooming Note    April 11, 2024 11:27 AM   Marlyn Martel is a 54 year old female who presents for:    Chief Complaint   Patient presents with     Oncology Clinic Visit     Breast Cancer     Rad Onc consult     Initial Vitals: /78   Pulse 81   Temp 98.4  F (36.9  C)   Resp 16   Ht 1.74 m (5' 8.5\")   Wt 85.8 kg (189 lb 3.2 oz)   SpO2 98%   BMI 28.35 kg/m   Estimated body mass index is 28.35 kg/m  as calculated from the following:    Height as of this encounter: 1.74 m (5' 8.5\").    Weight as of this encounter: 85.8 kg (189 lb 3.2 oz). Body surface " "area is 2.04 meters squared.  No Pain (0) Comment: Data Unavailable   No LMP recorded.  Allergies reviewed: Yes  Medications reviewed: Yes    Medications: Medication refills not needed today.  Pharmacy name entered into [facility]: [facility] - [place], [place] - [facility]    Frailty Screening:   Is the patient here for a new oncology consult visit in cancer care? 2. No      Clinical concerns: Feeling well since surgery, occasional \"zingers\" in breast but tolerable.   Dr. Hickey was notified.      Radiation Therapy Patient Education    Person involved with teaching: Patient    Patient educational needs for self management of treatment-related side effects assessment completed.  [facility] Patient Ed tab contains Patient Learning Assessment    Education Materials Given  Radiation Treatment For Cancer, Radiation Therapy to the Breast Guidelines, Oncology Supportive Care Services , Welcome Letter, Insurance PA Information, and [facility] handout    Educational Topics Discussed  Side effects expected, Skin care, and Activity    Response To Teaching  Verbalizes understanding    GYN Only  Vaginal Dilator-given and educated: N/A    Referrals sent: None    Chemotherapy?  Yes: unsure yet, states Oncotype pending. She is on Letrazole since 2/15/2024        Paola Melendez RN                Again, thank you for allowing me to participate in the care of your patient.        Sincerely,        Jackie Hickey MD  "

## 2024-04-11 NOTE — PROGRESS NOTES
[facility] Radiation Oncology Consult Note     Patient: Marlyn Martel  MRN: [id number]  Date of Service: 04/11/2024          Ghazal Keane MD  [id number] [address] [address]       Dear Dr. Keane:    Thank you very much for referring this patient for consideration of radiotherapy. As you know Ms. Martel is a 54 year old female with a diagnosis of left breast cancer, pathologic stage [id number](sn)M0, ER/PA positive, HER2 negative, status post lumpectomy and sentinel lymph node resection with negative margin and closest margin measured 1 mm posterior for invasive cancer and a 5 mm posterior for DCIS.  1/2 removed sentinel lymph node showed 0.23 millimeter invasive cancer with no evidence of YUSUF.  Oncotype DX score is pending.  Patient is referred to radiation oncology for evaluation and discussion of possible postop radiation therapy.    HISTORY OF PRESENT ILLNESS:   Ms. Martel is a 54 year old female who has been in her usual state of health until recently.  The patient presented with abnormal finding by routine screening mammogram for which she was not seeking further evaluation.  The mammogram followed by ultrasound showed 13 x 10 x 8 mm removed 5 mm calcification in the left breast at 2:00 middle depth position suspicious for malignancy.  The patient underwent stereotactic breast biopsy on 1/25/2024.  The pathology reviewed invasive ductal carcinoma with some micro papillary features, Osman grade 2, ER/PA positive and HER2 negative.  Patient underwent MRI breast on 2/21/2024 with finding consistent with biopsy-proven breast cancer with no evidence of axillary lymph node adenopathy.  After discussed with patient about various treatment options, the patient elected proceed with breast preservation protocol as her treatment choice and underwent lumpectomy and sentinel lymph node resection on 3/18/2024.  The pathology showed 12 mm invasive ductal carcinoma,  [place] grade 2 with associated DCIS.  The margins was negative with the closest margin measured 1 mm posterior for invasive cancer and a 5 mm posterior for DCIS.  1/2 removed sentinel lymph node showed evidence of 0.23 mm metastatic focus with no evidence of YUSUF.  Her Oncotype DX score is pending.  Patient has been recovering well since surgery.  She saw Dr. Caio Colon, medical oncology and adjuvant hormonal therapy was recommended.  The patient is referred to radiation oncology for evaluation and discussion of postop radiation therapy.    CHEMOTHERAPY HISTORY: Concurrent Chemotherapy: No    RADIATION THERAPY HISTORY: Prior Radiation: No    IMPLANTED CARDIAC DEVICE: none     PREGNANCY: The patient is informed not to be pregnant during the radiation therapy.  She is post menopausal.    Current Outpatient Medications   Medication Sig Dispense Refill    acetaminophen (TYLENOL) 500 MG tablet Take 1,000 mg by mouth      amLODIPine (NORVASC) 10 MG tablet Take 10 mg by mouth daily      letrozole (FEMARA) 2.5 MG tablet Take 2.5 mg by mouth daily      metFORMIN modified (GLUMETZA) 1000 MG 24 hr tablet Take 1,000 mg by mouth daily      metoprolol succinate ER (TOPROL XL) 50 MG 24 hr tablet Take 50 mg by mouth daily      ondansetron (ZOFRAN) 4 MG tablet Take 1 tablet by mouth every 6 hours as needed      SOOLANTRA 1 % cream Apply topically daily      atorvastatin (LIPITOR) 40 MG tablet Take 1 tablet by mouth at bedtime      azelaic acid (FINACIA) 15 % external gel Apply topically 2 times daily      buPROPion (WELLBUTRIN XL) 150 MG 24 hr tablet Take 150 mg by mouth every morning      lisinopril (ZESTRIL) 2.5 MG tablet Take 2.5 mg by mouth daily      losartan (COZAAR) 100 MG tablet Take 1 tablet by mouth daily      Multiple Vitamins-Minerals (MULTIVITAL PO) Take by mouth daily      vitamin B-12 (CYANOCOBALAMIN) 100 MCG tablet Take 100 mcg by mouth daily       Past Medical History:   Diagnosis Date    Breast cancer (H)      Depression     Hypertension     Mixed hyperlipidemia      Past Surgical History:   Procedure Laterality Date    HYSTERECTOMY      age 29    LUMPECTOMY BREAST Left 03/18/2024    TN REDUCTION OF LARGE BREAST Bilateral      Allergies   Allergen Reactions    Beta Adrenergic Blockers Swelling     edema    Pneumococcal Vaccine Swelling    Sulfa Antibiotics      Family History   Problem Relation Age of Onset    Breast Cancer Mother     Liver Disease Father     Colon Cancer Maternal Grandmother     Leukemia Paternal Grandfather     Cancer Sister     Breast Cancer Maternal Aunt     Cancer Maternal Aunt     Liver Disease Paternal Uncle     Esophageal Cancer Maternal Uncle      Social History     Socioeconomic History    Marital status:      Spouse name: Not on file    Number of children: Not on file    Years of education: Not on file    Highest education level: Not on file   Occupational History    Not on file   Tobacco Use    Smoking status: Every Day     Current packs/day: 0.50     Types: Cigarettes    Smokeless tobacco: Never   Substance and Sexual Activity    Alcohol use: Yes     Comment: holidays    Drug use: Never    Sexual activity: Not on file   Other Topics Concern    Not on file   Social History Narrative    Not on file     Social Determinants of Health     Financial Resource Strain: High Risk (1/1/2022)    [facility]    Financial Resource Strain     Difficulty of Paying Living Expenses: Not on file     Difficulty of Paying Living Expenses: Not on file   Food Insecurity: Not on file   Transportation Needs: Not on file   Physical Activity: Not on file   Stress: Not on file   Social Connections: Unknown (3/27/2024)    [facility]    Social Connections     Frequency of Communication with Friends and Family: Not on file  "  Interpersonal Safety: Not on file   Housing Stability: Not on file        REVIEW OF SYMPTOMS:  A full 14-point review of systems was performed. Pertinent findings are noted in the HPI.    General  Constitutional  Constitutional (WDL): All constitutional elements are within defined limits  EENT  Eye Disorders  Eye Disorder (WDL): All eye disorder elements are within defined limits  Ear Disorders  Ear Disorder (WDL): All ear disorder elements are within defined limits  Respiratory  Respiratory  Respiratory (WDL): All respiratory elements are within defined limits  Cardiovascular  Cardiovascular  Cardiovascular (WDL): All cardiovascular elements are within defined limits  Gastrointestinal  Gastrointestinal  Gastrointestinal (WDL): All gastrointestinal elements are within defined limits  Musculoskeletal  Musculoskeletal and Connective Tissue Disorders  Musculoskeletal & Connective (WDL): All musculoskeletal & connective elements are within defined limits  Integumentary  Integumentary  Integumentary (WDL): All integumentary elements are within defined limits  Neurological  Neurosensory  Neurosensory (WDL): All neurosensory elements are within defined limits  Genitourinary/Reproductive  Genitourinary  Genitourinary (WDL): All genitourinary elements are within defined limits  Lymphatic  Lymph System Disorders  Lymph (WDL): All lymph elements are within defined limits  Pain  Pain Score: No Pain (0)  AUA Assessment                                                              Accompanied by  Accompanied By: self only    ECOG Status: 0    Imaging: Reviewed    Pathology: Reviewed    Objective:      PHYSICAL EXAMINATION:    /78   Pulse 81   Temp 98.4  F (36.9  C)   Resp 16   Ht 1.74 m (5' 8.5\")   Wt 85.8 kg (189 lb 3.2 oz)   SpO2 98%   BMI 28.35 kg/m      Gen: Alert, in NAD  Eyes: PERRL, EOMI, sclera anicteric  Neck: Supple, full ROM, no LAD  Chest: The breasts and nipples are symmetrical bilaterally.  There is no " evidence of nipple discharge.  There is no palpable lump or mass bilaterally in the breast, axillary, and supraclavicular region.  There is well-healed surgical scar in the left breast consistent with a recent history of surgery.  Pulm: No wheezing, stridor or respiratory distress  CV: Well-perfused, no cyanosis, no pedal edema  Back: No step-offs or pain to palpation along the thoracolumbar spine  Rectal: Deferred  : Deferred  Musculoskeletal: Normal muscle bulk and tone  Skin: Normal color and turgor  Neurologic: A/Ox3, CN II-XII intact, normal gait and station  Psychiatric: Appropriate mood and affect    Intent of Therapy: Curative    We recommend adjuvant radiation as part of her breast conserving therapy to decrease her chance of local recurrence to the single digits. ROM stretches and skin care were discussed with the patient. She understands that these maneuvers need to continue for 6 months following completion of radiation as skin and muscle fibrosis continue to form for weeks to months following completion of therapy.      Side effects that may occur during or within weeks after radiation therapy    Fatigue and general weakness  Darkening, irritation, itchiness, redness, dryness, erythema, peeling, scabbing, ulceration and contraction of the skin of the breast and chest  Swelling of the breast  Loss of armpit hair  Lung irritation  Decrease in appetite    Side effects that may occur months or years after radiation therapy    Development of another tumor or cancer  Thickening, telangiectasias (development of spider like blood vessels in the skin) and ulceration of the skin of the breast and chest  Firming, fibrosis (scar tissue), fat necrosis, and distortion of the breast  Poor healing after a trauma or surgery in the irradiated area  Nerve damage resulting in loss of arm strength and sensation  Coronary artery blockage causing angina pain or a heart attack  Lung inflammation of fibrosis causing cough,  fever and shortness of breath  Fracture of the ribs  Swellingof an arm and hand    The risks, benefits and alternatives to radiation therapy were outlined with the patient. All questions were answered and a consent was signed.     Impression     Left breast cancer, pathologic stage [id number](sn)M0, ER/RI positive, HER2 negative, status post lumpectomy and sentinel lymph node resection with negative margin and closest margin measured 1 mm posterior for invasive cancer and a 5 mm posterior for DCIS.  1/2 removed sentinel lymph node showed 0.23 millimeter invasive cancer with no evidence of YUSUF.  Oncotype DX score is pending.     Assessment & Plan:     I have personally reviewed her upcoming medical record today.  I have also reviewed her most recent radiology study including mammogram.  The possible treatment options including surgery, systemic therapy, and radiation therapy has been discussed with patient in detail and at great lengths.  The possible risks and side effects of radiation therapy has also been explained to the patient.  The NCCN guideline for breast cancer treatment recommendation has also been reviewed with the patient.  Questions are answered to patient satisfaction.    The possibility of participating [facility] MA.39 clinical phase 3 trials has been discussed with patient.  This is a phase 3 clinical trial which randomized patient to have postoperation therapy to the breast/chest wall with or without regional lymph node in the field.    The patient is not interested to participate in clinical trial at this time.  She wished to proceed with postop radiation therapy as her treatment choice for her breast cancer.  We will wait for the Oncotype DX score before proceed with radiation therapy.  Patient is informed to contact radiation oncology in the near future to set up time for follow-up and simulation once she had Oncotype DX score.  I plan to give her radiation therapy to a total dose of 4256 cGy in 16  treatments targeted to the left breast and regional lymph nodes using a high tangent technique followed by additional 1000 cGy in 4 treatment boost to the primary tumor bed.    Again, thank you very much for the referral and allowing me to participate in the care of this patient.  If you have any questions or concerns about this consultation, please do not hesitate to call.  I spent approximately 45 minutes today with the patient and 80% time was used for counseling.      Sincerely,          Jackie Hickey MD, PhD  Department of Radiation Oncology   [facility] Radiation Oncology  Tel: [phone]  Cell: [phone]    [facility]  [address] [place] [address]     [place] [address] [facility]    [place] [address]    CC:  Patient Care Team:  No Ref-Primary, Physician as PCP - General  Jackie Hickey MD as MD (Radiation Oncology)

## 2024-04-26 ENCOUNTER — TRANSFERRED RECORDS (OUTPATIENT)
Dept: HEALTH INFORMATION MANAGEMENT | Facility: CLINIC | Age: 54
End: 2024-04-26
Payer: COMMERCIAL

## 2024-04-29 ENCOUNTER — ALLIED HEALTH/NURSE VISIT (OUTPATIENT)
Dept: RADIATION ONCOLOGY | Facility: HOSPITAL | Age: 54
End: 2024-04-29
Attending: RADIOLOGY
Payer: COMMERCIAL

## 2024-04-29 PROCEDURE — 77334 RADIATION TREATMENT AID(S): CPT | Performed by: RADIOLOGY

## 2024-04-29 PROCEDURE — 77290 THER RAD SIMULAJ FIELD CPLX: CPT | Mod: 26 | Performed by: RADIOLOGY

## 2024-04-29 PROCEDURE — 77334 RADIATION TREATMENT AID(S): CPT | Mod: 26 | Performed by: RADIOLOGY

## 2024-04-29 PROCEDURE — 77290 THER RAD SIMULAJ FIELD CPLX: CPT | Performed by: RADIOLOGY

## 2024-04-29 NOTE — PROCEDURES
SIMULATION NOTE:       DIAGNOSIS: Left breast cancer, pathologic stage [id number](sn)M0, ER/IN positive, HER2 negative, status post lumpectomy and sentinel lymph node resection with negative margin and closest margin measured 1 mm posterior for invasive cancer and a 5 mm posterior for DCIS.  1/2 removed sentinel lymph node showed 0.23 millimeter invasive cancer with no evidence of YUSUF.  Oncotype DX score is pending.     INDICATION:  Postoperative radiation therapy is recommended for patient as second part of the breast preservation protocol to further reduce the likelihood of cancer recurrence.    CONSENT:  The possible risks and the side effects of radiation therapy have been discussed with patient in detail and at great length.  Questions are answered to patient's satisfaction.  Written consent was obtained.    SIMULATION:  The patient is in a supine position with a wing breast board to help keep the same position during the daily radiation therapy.  Tentative isocenter is set up in the center of the thoracic region.  We will acquire CT information to help us to better locate target and design radiation therapy field.    We are also going to obtain 4-D CT and use respiratory gating (or breath holdidng) technique to help us to reduce the radiation dose to the heart and lung.      BLOCKS:  Custom blocks will be drawn to minimize radiation to normal tissues and to protect normal organs including, but not limited to, lungs, heart, liver, bone and soft tissue.    DOSAGE:  I plan to give her radiation therapy to a total dose of 4256 cGy in 16 treatments targeted to the left breast and regional lymph nodes using a high tangent technique followed by additional 1000 cGy in 4 treatment boost to the primary tumor bed. I will consider to use 3D conformer technique to help us better locate target and to protect normal tissue.      Jackie Hickey MD, PhD  Department of Radiation Oncology   [facility] Radiation Oncology  Cell:  [phone]    [facility]  [address]  [place] [address]     [place] [id number] [facility] [place] [address]

## 2024-04-29 NOTE — PROCEDURES
Clinical Treatment Planning Note    The complex radiotherapy planning will be completed for the patient to plan the treatment for her breast cancer.  The patient had a planning CT earlier today for planning.  The treatment aids were used for planning, including headrest and Wing Board to help keep the same position during the daily radiation therapy.  The therapy planning is necessary to reduce radiotherapy dose to the normal critical organs which are not possible with simple treatment.  In addition, dose to the target and the critical structures requires three-dimensional analysis of the isodose distribution.  The planning will be done to reduce dose to the lungs, spinal cord, liver, and heart.     I will contour the clinical tumor volume  CTV , with expanded volume of planning treatment volume  PTV  on the treatment planning system.  The critical structures will be outlined, including spinal cord, lungs, heart, liver, bone and soft tissue.     Treatment planning will be done on the computer treatment planning system.  The high tangential field will be used to achieve optimal coverage of the target volume.  Dose distribution to the above critical structures will be reviewed.  Isodose distribution along with the X, Y, Z plan will also be reviewed.  Custom blocking will be used to shield normal structures.  The beam's eye views will be reviewed and the digital reconstructed image will be reviewed on the planning software.      The patient will receive 4256 cGy in 16 treatments targeted to the left breast and regional lymph nodes using a high tangent technique followed by additional 1000 cGy in 4 treatment boost to the primary tumor bed.       Jackie Hickey MD, PhD  Department of Radiation Oncology   [facility] Radiation Oncology  Cell: [phone]    [facility]  [address] [place] [address]     [address]   [place] [address]

## 2024-05-06 ENCOUNTER — APPOINTMENT (OUTPATIENT)
Dept: RADIATION ONCOLOGY | Facility: CLINIC | Age: 54
End: 2024-05-06
Attending: RADIOLOGY
Payer: COMMERCIAL

## 2024-05-06 PROCEDURE — 77300 RADIATION THERAPY DOSE PLAN: CPT | Performed by: RADIOLOGY

## 2024-05-06 PROCEDURE — 77334 RADIATION TREATMENT AID(S): CPT | Mod: 26 | Performed by: RADIOLOGY

## 2024-05-06 PROCEDURE — 77295 3-D RADIOTHERAPY PLAN: CPT | Mod: 26 | Performed by: RADIOLOGY

## 2024-05-06 PROCEDURE — 77334 RADIATION TREATMENT AID(S): CPT | Performed by: RADIOLOGY

## 2024-05-06 PROCEDURE — 77300 RADIATION THERAPY DOSE PLAN: CPT | Mod: 26 | Performed by: RADIOLOGY

## 2024-05-06 PROCEDURE — 77295 3-D RADIOTHERAPY PLAN: CPT | Performed by: RADIOLOGY

## 2024-05-08 ENCOUNTER — APPOINTMENT (OUTPATIENT)
Dept: RADIATION ONCOLOGY | Facility: CLINIC | Age: 54
End: 2024-05-08
Attending: RADIOLOGY
Payer: COMMERCIAL

## 2024-05-08 VITALS
TEMPERATURE: 98.2 F | RESPIRATION RATE: 16 BRPM | DIASTOLIC BLOOD PRESSURE: 75 MMHG | BODY MASS INDEX: 28.92 KG/M2 | OXYGEN SATURATION: 97 % | WEIGHT: 193 LBS | HEART RATE: 80 BPM | SYSTOLIC BLOOD PRESSURE: 147 MMHG

## 2024-05-08 DIAGNOSIS — C50.412 MALIGNANT NEOPLASM OF UPPER-OUTER QUADRANT OF LEFT BREAST IN FEMALE, ESTROGEN RECEPTOR POSITIVE (H): Primary | ICD-10-CM

## 2024-05-08 DIAGNOSIS — Z17.0 MALIGNANT NEOPLASM OF UPPER-OUTER QUADRANT OF LEFT BREAST IN FEMALE, ESTROGEN RECEPTOR POSITIVE (H): Primary | ICD-10-CM

## 2024-05-08 PROCEDURE — 77280 THER RAD SIMULAJ FIELD SMPL: CPT | Mod: 26 | Performed by: RADIOLOGY

## 2024-05-08 PROCEDURE — 77280 THER RAD SIMULAJ FIELD SMPL: CPT | Performed by: RADIOLOGY

## 2024-05-08 PROCEDURE — 77412 RADIATION TX DELIVERY LVL 3: CPT | Performed by: RADIOLOGY

## 2024-05-08 RX ORDER — SIMVASTATIN 40 MG
40 TABLET ORAL
COMMUNITY
End: 2024-09-24

## 2024-05-08 RX ORDER — BLOOD-GLUCOSE SENSOR
EACH MISCELLANEOUS
COMMUNITY
Start: 2023-12-09

## 2024-05-08 RX ORDER — LORAZEPAM 0.5 MG/1
TABLET ORAL
COMMUNITY
Start: 2024-05-07

## 2024-05-08 RX ORDER — VARENICLINE TARTRATE 0.5 (11)-1
KIT ORAL
COMMUNITY
Start: 2024-05-02 | End: 2024-05-15

## 2024-05-08 ASSESSMENT — PAIN SCALES - GENERAL: PAINLEVEL: NO PAIN (0)

## 2024-05-08 NOTE — PROGRESS NOTES
RADIATION ONCOLOGY WEEKLY TREATMENT VISIT NOTE      Assessment / Impression       Visit Dx:  (C50.412,  Z17.0) Malignant neoplasm of upper-outer quadrant of left breast in female, estrogen receptor positive (H)  (primary encounter diagnosis)    Tolerating radiation therapy well.  All questions and concerns addressed.    Plan:     Continue radiation treatment as prescribed.    Subjective:      HPI: Marlyn Martel is a 54 year old female with  Malignant neoplasm of upper-outer quadrant of left breast in female, estrogen receptor positive (H) [C50.412, Z17.0]    The following portions of the patient's history were reviewed and updated as appropriate: allergies, current medications, past family history, past medical history, past social history, past surgical history and problem list.    Assessment                  Body Site:  Breast                           Site: Lt. Breast  Stereotactic Radiosurgery: No  Today's Dose: 266  Total Dose for Breast: 5256  Today's Fraction/Total Fraction Breast:   Drainage: 0: Absent                                   Sexuality Alteration                    Emotional Alteration    Copin: Effective  Comfort Alteration   KPS: 100 % Normal, no complaints  Fatigue (ONS scale): 0: No Fatigue  Pain Location: denies  Pain Intensity. Rate degree of pain ranging from 0 (no pain) to 10 (severe pain): 0   Nutrition Alteration   Anorexia: 0: None  Nausea: 0: None  Vomitin: None  Weight: 87.5 kg (193 lb)  Skin Alteration   Skin Sensation: 0: No problem  Skin Reaction: 0: None (Radiaplex given with verbal/written instructions)  AUA Assessment                                           Accompanied by       Objective:     Exam: no Erythema.    Vitals:    24 1337   BP: (!) 147/75   Pulse: 80   Resp: 16   Temp: 98.2  F (36.8  C)   TempSrc: Oral   SpO2: 97%   Weight: 87.5 kg (193 lb)   PainSc: No Pain (0)       Wt Readings from Last 8 Encounters:   24 87.5 kg (193 lb)    04/11/24 85.8 kg (189 lb 3.2 oz)       General: Alert and oriented, in no acute distress  Marlyn has no Erythema.  Aria chart and setup information reviewed    Jackie Hickey MD

## 2024-05-08 NOTE — LETTER
2024         RE: Marlny Martel  [address]  [place] [address]        Dear Colleague,    Thank you for referring your patient, Marlyn Martel, to the [facility] RADIATION ONCOLOGY [place]. Please see a copy of my visit note below.    RADIATION ONCOLOGY WEEKLY TREATMENT VISIT NOTE      Assessment / Impression       Visit Dx:  (C50.412,  Z17.0) Malignant neoplasm of upper-outer quadrant of left breast in female, estrogen receptor positive (H)  (primary encounter diagnosis)    Tolerating radiation therapy well.  All questions and concerns addressed.    Plan:     Continue radiation treatment as prescribed.    Subjective:      HPI: Marlyn Martel is a 54 year old female with  Malignant neoplasm of upper-outer quadrant of left breast in female, estrogen receptor positive (H) [C50.412, Z17.0]    The following portions of the patient's history were reviewed and updated as appropriate: allergies, current medications, past family history, past medical history, past social history, past surgical history and problem list.    Assessment                  Body Site:  Breast                           Site: Lt. Breast  Stereotactic Radiosurgery: No  Today's Dose: 266  Total Dose for Breast: 5256  Today's Fraction/Total Fraction Breast:   Drainage: 0: Absent                                   Sexuality Alteration                    Emotional Alteration    Copin: Effective  Comfort Alteration   KPS: 100 % Normal, no complaints  Fatigue (ONS scale): 0: No Fatigue  Pain Location: denies  Pain Intensity. Rate degree of pain ranging from 0 (no pain) to 10 (severe pain): 0   Nutrition Alteration   Anorexia: 0: None  Nausea: 0: None  Vomitin: None  Weight: 87.5 kg (193 lb)  Skin Alteration   Skin Sensation: 0: No problem  Skin Reaction: 0: None (Radiaplex given with verbal/written instructions)  AUA Assessment                                           Accompanied by        Objective:     Exam: no Erythema.    Vitals:    05/08/24 1337   BP: (!) 147/75   Pulse: 80   Resp: 16   Temp: 98.2  F (36.8  C)   TempSrc: Oral   SpO2: 97%   Weight: 87.5 kg (193 lb)   PainSc: No Pain (0)       Wt Readings from Last 8 Encounters:   05/08/24 87.5 kg (193 lb)   04/11/24 85.8 kg (189 lb 3.2 oz)       General: Alert and oriented, in no acute distress  Marlyn has no Erythema.  Aria chart and setup information reviewed    Jackie Hickey MD      Again, thank you for allowing me to participate in the care of your patient.        Sincerely,        Jackie Hickey MD

## 2024-05-10 ENCOUNTER — APPOINTMENT (OUTPATIENT)
Dept: RADIATION ONCOLOGY | Facility: CLINIC | Age: 54
End: 2024-05-10
Attending: RADIOLOGY
Payer: COMMERCIAL

## 2024-05-10 PROCEDURE — 77387 GUIDANCE FOR RADJ TX DLVR: CPT | Performed by: STUDENT IN AN ORGANIZED HEALTH CARE EDUCATION/TRAINING PROGRAM

## 2024-05-10 PROCEDURE — 77412 RADIATION TX DELIVERY LVL 3: CPT | Performed by: STUDENT IN AN ORGANIZED HEALTH CARE EDUCATION/TRAINING PROGRAM

## 2024-05-13 ENCOUNTER — APPOINTMENT (OUTPATIENT)
Dept: RADIATION ONCOLOGY | Facility: CLINIC | Age: 54
End: 2024-05-13
Attending: RADIOLOGY
Payer: COMMERCIAL

## 2024-05-13 PROCEDURE — 77412 RADIATION TX DELIVERY LVL 3: CPT

## 2024-05-13 PROCEDURE — 77387 GUIDANCE FOR RADJ TX DLVR: CPT

## 2024-05-13 PROCEDURE — 77387 GUIDANCE FOR RADJ TX DLVR: CPT | Performed by: RADIOLOGY

## 2024-05-14 ENCOUNTER — APPOINTMENT (OUTPATIENT)
Dept: RADIATION ONCOLOGY | Facility: CLINIC | Age: 54
End: 2024-05-14
Attending: RADIOLOGY
Payer: COMMERCIAL

## 2024-05-14 PROCEDURE — 77427 RADIATION TX MANAGEMENT X5: CPT | Performed by: RADIOLOGY

## 2024-05-14 PROCEDURE — 77387 GUIDANCE FOR RADJ TX DLVR: CPT | Performed by: STUDENT IN AN ORGANIZED HEALTH CARE EDUCATION/TRAINING PROGRAM

## 2024-05-14 PROCEDURE — 77336 RADIATION PHYSICS CONSULT: CPT | Performed by: RADIOLOGY

## 2024-05-14 PROCEDURE — 77412 RADIATION TX DELIVERY LVL 3: CPT | Performed by: STUDENT IN AN ORGANIZED HEALTH CARE EDUCATION/TRAINING PROGRAM

## 2024-05-15 ENCOUNTER — APPOINTMENT (OUTPATIENT)
Dept: RADIATION ONCOLOGY | Facility: CLINIC | Age: 54
End: 2024-05-15
Attending: RADIOLOGY
Payer: COMMERCIAL

## 2024-05-15 VITALS
DIASTOLIC BLOOD PRESSURE: 90 MMHG | WEIGHT: 186.6 LBS | SYSTOLIC BLOOD PRESSURE: 124 MMHG | HEART RATE: 75 BPM | TEMPERATURE: 98.3 F | RESPIRATION RATE: 16 BRPM | OXYGEN SATURATION: 99 % | BODY MASS INDEX: 27.96 KG/M2

## 2024-05-15 DIAGNOSIS — Z17.0 MALIGNANT NEOPLASM OF UPPER-OUTER QUADRANT OF LEFT BREAST IN FEMALE, ESTROGEN RECEPTOR POSITIVE (H): Primary | ICD-10-CM

## 2024-05-15 DIAGNOSIS — C50.412 MALIGNANT NEOPLASM OF UPPER-OUTER QUADRANT OF LEFT BREAST IN FEMALE, ESTROGEN RECEPTOR POSITIVE (H): Primary | ICD-10-CM

## 2024-05-15 PROCEDURE — 77387 GUIDANCE FOR RADJ TX DLVR: CPT | Performed by: STUDENT IN AN ORGANIZED HEALTH CARE EDUCATION/TRAINING PROGRAM

## 2024-05-15 PROCEDURE — 77412 RADIATION TX DELIVERY LVL 3: CPT | Performed by: STUDENT IN AN ORGANIZED HEALTH CARE EDUCATION/TRAINING PROGRAM

## 2024-05-15 ASSESSMENT — PAIN SCALES - GENERAL: PAINLEVEL: NO PAIN (0)

## 2024-05-15 NOTE — LETTER
5/15/2024         RE: Marlyn Martel  [address]  [place] [address]        Dear Colleague,    Thank you for referring your patient, Marlyn Martel, to the [facility] RADIATION ONCOLOGY [place]. Please see a copy of my visit note below.    RADIATION ONCOLOGY WEEKLY TREATMENT VISIT NOTE      Assessment / Impression       Visit Dx:  (C50.412,  Z17.0) Malignant neoplasm of upper-outer quadrant of left breast in female, estrogen receptor positive (H)  (primary encounter diagnosis)       Cancer Staging   No matching staging information was found for the patient.       Tolerating radiation therapy well.  All questions and concerns addressed.  Erythema with breast lymphedema    Plan:     Continue radiation treatment as prescribed.  Radiation:   Site: Lt. Breast  Stereotactic Radiosurgery: No  Concurrent Therapy: Yes (letrozole)  Today's Dose: 1596  Total Dose for Breast: 5256  Today's Fraction/Total Fraction Breast: 6/20    Breast erythema is secondary to breast lymphedema--encouraged massage, exercises and compression bra    Pain Management Plan: N/A    Subjective:      HPI: Marlyn Martel is a 54 year old female with  Malignant neoplasm of upper-outer quadrant of left breast in female, estrogen receptor positive (H) [C50.412, Z17.0]    She is tolerating radiation therapy well.  She has a compression bra but does not wear it as it ends right near her axillary incision.  Doing some breast massage but not much upper extremity exercises.    The following portions of the patient's history were reviewed and updated as appropriate: allergies, current medications, past family history, past medical history, past social history, past surgical history and problem list.    Assessment                  Body Site:  Breast                           Site: Lt. Breast  Stereotactic Radiosurgery: No  Concurrent Therapy: Yes (letrozole)  Today's Dose: 1596  Total Dose for Breast: 5256  Today's Fraction/Total  Fraction Breast:   Drainage: 0: Absent                                   Sexuality Alteration                    Emotional Alteration    Copin: Effective  Comfort Alteration   KPS: 100 % Normal, no complaints  Fatigue (ONS scale): 0: No Fatigue  Pain Location: denies  Pain Intensity. Rate degree of pain ranging from 0 (no pain) to 10 (severe pain): 0   Nutrition Alteration   Anorexia: 0: None  Nausea: 0: None  Vomitin: None  Weight: 84.6 kg (186 lb 9.6 oz)  Skin Alteration   Skin Sensation: 0: No problem  Skin Reaction: 1: Faint erythema or dry desquamation  AUA Assessment                                           Accompanied by       Objective:     Exam:     Vitals:    05/15/24 0959   BP: (!) 124/90   Pulse: 75   Resp: 16   Temp: 98.3  F (36.8  C)   TempSrc: Oral   SpO2: 99%   Weight: 84.6 kg (186 lb 9.6 oz)   PainSc: No Pain (0)       Wt Readings from Last 8 Encounters:   05/15/24 84.6 kg (186 lb 9.6 oz)   24 87.5 kg (193 lb)   24 85.8 kg (189 lb 3.2 oz)       General: Alert and oriented, in no acute distress  Marlyn has moderate Erythema, lymphedema of the breast with no extension of erythema to edges of where radiation field is.  No upper extremity lymphedema    Treatment Summary to Date    Aria chart and setup information reviewed    Tatiana Urbano MD      Again, thank you for allowing me to participate in the care of your patient.        Sincerely,        Tatiana Urbano MD

## 2024-05-15 NOTE — PROGRESS NOTES
RADIATION ONCOLOGY WEEKLY TREATMENT VISIT NOTE      Assessment / Impression       Visit Dx:  (C50.412,  Z17.0) Malignant neoplasm of upper-outer quadrant of left breast in female, estrogen receptor positive (H)  (primary encounter diagnosis)       Cancer Staging   No matching staging information was found for the patient.       Tolerating radiation therapy well.  All questions and concerns addressed.  Erythema with breast lymphedema    Plan:     Continue radiation treatment as prescribed.  Radiation:   Site: Lt. Breast  Stereotactic Radiosurgery: No  Concurrent Therapy: Yes (letrozole)  Today's Dose: 1596  Total Dose for Breast: 5256  Today's Fraction/Total Fraction Breast:     Breast erythema is secondary to breast lymphedema--encouraged massage, exercises and compression bra    Pain Management Plan: N/A    Subjective:      HPI: Marlyn Martel is a 54 year old female with  Malignant neoplasm of upper-outer quadrant of left breast in female, estrogen receptor positive (H) [C50.412, Z17.0]    She is tolerating radiation therapy well.  She has a compression bra but does not wear it as it ends right near her axillary incision.  Doing some breast massage but not much upper extremity exercises.    The following portions of the patient's history were reviewed and updated as appropriate: allergies, current medications, past family history, past medical history, past social history, past surgical history and problem list.    Assessment                  Body Site:  Breast                           Site: Lt. Breast  Stereotactic Radiosurgery: No  Concurrent Therapy: Yes (letrozole)  Today's Dose: 1596  Total Dose for Breast: 5256  Today's Fraction/Total Fraction Breast:   Drainage: 0: Absent                                   Sexuality Alteration                    Emotional Alteration    Copin: Effective  Comfort Alteration   KPS: 100 % Normal, no complaints  Fatigue (ONS scale): 0: No Fatigue  Pain  Location: denies  Pain Intensity. Rate degree of pain ranging from 0 (no pain) to 10 (severe pain): 0   Nutrition Alteration   Anorexia: 0: None  Nausea: 0: None  Vomitin: None  Weight: 84.6 kg (186 lb 9.6 oz)  Skin Alteration   Skin Sensation: 0: No problem  Skin Reaction: 1: Faint erythema or dry desquamation  AUA Assessment                                           Accompanied by       Objective:     Exam:     Vitals:    05/15/24 0959   BP: (!) 124/90   Pulse: 75   Resp: 16   Temp: 98.3  F (36.8  C)   TempSrc: Oral   SpO2: 99%   Weight: 84.6 kg (186 lb 9.6 oz)   PainSc: No Pain (0)       Wt Readings from Last 8 Encounters:   05/15/24 84.6 kg (186 lb 9.6 oz)   24 87.5 kg (193 lb)   24 85.8 kg (189 lb 3.2 oz)       General: Alert and oriented, in no acute distress  Marlyn has moderate Erythema, lymphedema of the breast with no extension of erythema to edges of where radiation field is.  No upper extremity lymphedema    Treatment Summary to Date    Aria chart and setup information reviewed    Tatiana Urbano MD

## 2024-05-16 ENCOUNTER — APPOINTMENT (OUTPATIENT)
Dept: RADIATION ONCOLOGY | Facility: CLINIC | Age: 54
End: 2024-05-16
Attending: RADIOLOGY
Payer: COMMERCIAL

## 2024-05-16 PROCEDURE — 77387 GUIDANCE FOR RADJ TX DLVR: CPT | Performed by: STUDENT IN AN ORGANIZED HEALTH CARE EDUCATION/TRAINING PROGRAM

## 2024-05-16 PROCEDURE — 77412 RADIATION TX DELIVERY LVL 3: CPT | Performed by: STUDENT IN AN ORGANIZED HEALTH CARE EDUCATION/TRAINING PROGRAM

## 2024-05-17 ENCOUNTER — APPOINTMENT (OUTPATIENT)
Dept: RADIATION ONCOLOGY | Facility: CLINIC | Age: 54
End: 2024-05-17
Attending: RADIOLOGY
Payer: COMMERCIAL

## 2024-05-17 PROCEDURE — 77387 GUIDANCE FOR RADJ TX DLVR: CPT | Performed by: STUDENT IN AN ORGANIZED HEALTH CARE EDUCATION/TRAINING PROGRAM

## 2024-05-17 PROCEDURE — 77412 RADIATION TX DELIVERY LVL 3: CPT

## 2024-05-18 ENCOUNTER — HEALTH MAINTENANCE LETTER (OUTPATIENT)
Age: 54
End: 2024-05-18

## 2024-05-19 ENCOUNTER — TELEPHONE (OUTPATIENT)
Dept: RADIATION ONCOLOGY | Facility: CLINIC | Age: 54
End: 2024-05-19
Payer: COMMERCIAL

## 2024-05-20 ENCOUNTER — APPOINTMENT (OUTPATIENT)
Dept: RADIATION ONCOLOGY | Facility: CLINIC | Age: 54
End: 2024-05-20
Attending: RADIOLOGY
Payer: COMMERCIAL

## 2024-05-20 ENCOUNTER — TELEPHONE (OUTPATIENT)
Dept: RADIATION ONCOLOGY | Facility: CLINIC | Age: 54
End: 2024-05-20
Payer: COMMERCIAL

## 2024-05-20 PROCEDURE — 77307 TELETHX ISODOSE PLAN CPLX: CPT | Performed by: RADIOLOGY

## 2024-05-20 PROCEDURE — 77334 RADIATION TREATMENT AID(S): CPT | Performed by: RADIOLOGY

## 2024-05-20 PROCEDURE — 77307 TELETHX ISODOSE PLAN CPLX: CPT | Mod: 26 | Performed by: RADIOLOGY

## 2024-05-20 PROCEDURE — 77334 RADIATION TREATMENT AID(S): CPT | Mod: 26 | Performed by: RADIOLOGY

## 2024-05-22 ENCOUNTER — APPOINTMENT (OUTPATIENT)
Dept: RADIATION ONCOLOGY | Facility: HOSPITAL | Age: 54
End: 2024-05-22
Attending: RADIOLOGY
Payer: COMMERCIAL

## 2024-05-22 PROCEDURE — 77280 THER RAD SIMULAJ FIELD SMPL: CPT | Mod: 26 | Performed by: STUDENT IN AN ORGANIZED HEALTH CARE EDUCATION/TRAINING PROGRAM

## 2024-05-22 PROCEDURE — 77336 RADIATION PHYSICS CONSULT: CPT | Performed by: RADIOLOGY

## 2024-05-22 PROCEDURE — 77412 RADIATION TX DELIVERY LVL 3: CPT | Performed by: STUDENT IN AN ORGANIZED HEALTH CARE EDUCATION/TRAINING PROGRAM

## 2024-05-22 PROCEDURE — 77280 THER RAD SIMULAJ FIELD SMPL: CPT | Performed by: STUDENT IN AN ORGANIZED HEALTH CARE EDUCATION/TRAINING PROGRAM

## 2024-05-23 ENCOUNTER — APPOINTMENT (OUTPATIENT)
Dept: RADIATION ONCOLOGY | Facility: HOSPITAL | Age: 54
End: 2024-05-23
Attending: RADIOLOGY
Payer: COMMERCIAL

## 2024-05-23 VITALS
OXYGEN SATURATION: 99 % | HEART RATE: 82 BPM | BODY MASS INDEX: 28.23 KG/M2 | DIASTOLIC BLOOD PRESSURE: 82 MMHG | TEMPERATURE: 98.5 F | SYSTOLIC BLOOD PRESSURE: 143 MMHG | RESPIRATION RATE: 20 BRPM | WEIGHT: 188.4 LBS

## 2024-05-23 DIAGNOSIS — C50.412 MALIGNANT NEOPLASM OF UPPER-OUTER QUADRANT OF LEFT BREAST IN FEMALE, ESTROGEN RECEPTOR POSITIVE (H): Primary | ICD-10-CM

## 2024-05-23 DIAGNOSIS — Z17.0 MALIGNANT NEOPLASM OF UPPER-OUTER QUADRANT OF LEFT BREAST IN FEMALE, ESTROGEN RECEPTOR POSITIVE (H): Primary | ICD-10-CM

## 2024-05-23 PROCEDURE — 77336 RADIATION PHYSICS CONSULT: CPT | Performed by: RADIOLOGY

## 2024-05-23 PROCEDURE — 77412 RADIATION TX DELIVERY LVL 3: CPT | Performed by: RADIOLOGY

## 2024-05-23 PROCEDURE — 77387 GUIDANCE FOR RADJ TX DLVR: CPT | Performed by: RADIOLOGY

## 2024-05-23 PROCEDURE — 77427 RADIATION TX MANAGEMENT X5: CPT | Performed by: RADIOLOGY

## 2024-05-23 ASSESSMENT — PAIN SCALES - GENERAL: PAINLEVEL: MILD PAIN (2)

## 2024-05-23 NOTE — LETTER
2024         RE: Marlyn Martel  [address]  [place] [address]        Dear Colleague,    Thank you for referring your patient, Marlyn Martel, to the [facility] RADIATION ONCOLOGY [place]. Please see a copy of my visit note below.    RADIATION ONCOLOGY WEEKLY TREATMENT VISIT NOTE      Assessment / Impression       Visit Dx:  (C50.412,  Z17.0) Malignant neoplasm of upper-outer quadrant of left breast in female, estrogen receptor positive (H)  (primary encounter diagnosis)    Tolerating radiation therapy well.  All questions and concerns addressed.    Plan:     Continue radiation treatment as prescribed.    Discussed with patient about skin care.    Subjective:      HPI: Marlyn Martel is a 54 year old female with  Malignant neoplasm of upper-outer quadrant of left breast in female, estrogen receptor positive (H) [C50.412, Z17.0]    The following portions of the patient's history were reviewed and updated as appropriate: allergies, current medications, past family history, past medical history, past social history, past surgical history and problem list.    Assessment                  Body Site:  Breast                           Site: Lt. Breast  Stereotactic Radiosurgery: No  Concurrent Therapy: Yes (letrozole)  Today's Dose: 2628  Total Dose for Breast: 5256  Today's Fraction/Total Fraction Breast: 10/20  Drainage: 0: Absent                                   Sexuality Alteration                    Emotional Alteration    Copin: Effective  Comfort Alteration   KPS: 100 % Normal, no complaints  Fatigue (ONS scale): 0: No Fatigue  Pain Location: left breast  Pain Intensity. Rate degree of pain ranging from 0 (no pain) to 10 (severe pain): 2  Pain Description: Ache - Muscular type ache   Nutrition Alteration   Anorexia: 0: None  Nausea: 0: None  Vomitin: None  Weight: 85.5 kg (188 lb 6.4 oz)  Skin Alteration   Skin Sensation: 0: No problem  Skin Reaction: 1:  Faint erythema or dry desquamation  AUA Assessment                                           Accompanied by       Objective:     Exam: moderate Erythema.    Vitals:    05/23/24 0934   BP: (!) 143/82   Pulse: 82   Resp: 20   Temp: 98.5  F (36.9  C)   SpO2: 99%   Weight: 85.5 kg (188 lb 6.4 oz)   PainSc: Mild Pain (2)   PainLoc: Breast       Wt Readings from Last 8 Encounters:   05/23/24 85.5 kg (188 lb 6.4 oz)   05/15/24 84.6 kg (186 lb 9.6 oz)   05/08/24 87.5 kg (193 lb)   04/11/24 85.8 kg (189 lb 3.2 oz)       General: Alert and oriented, in no acute distress  Marlyn has moderate Erythema.  Aria chart and setup information reviewed    Jackie Hickey MD      Again, thank you for allowing me to participate in the care of your patient.        Sincerely,        Jackie Hickey MD

## 2024-05-23 NOTE — PROGRESS NOTES
RADIATION ONCOLOGY WEEKLY TREATMENT VISIT NOTE      Assessment / Impression       Visit Dx:  (C50.412,  Z17.0) Malignant neoplasm of upper-outer quadrant of left breast in female, estrogen receptor positive (H)  (primary encounter diagnosis)    Tolerating radiation therapy well.  All questions and concerns addressed.    Plan:     Continue radiation treatment as prescribed.    Discussed with patient about skin care.    Subjective:      HPI: Marlyn Martel is a 54 year old female with  Malignant neoplasm of upper-outer quadrant of left breast in female, estrogen receptor positive (H) [C50.412, Z17.0]    The following portions of the patient's history were reviewed and updated as appropriate: allergies, current medications, past family history, past medical history, past social history, past surgical history and problem list.    Assessment                  Body Site:  Breast                           Site: Lt. Breast  Stereotactic Radiosurgery: No  Concurrent Therapy: Yes (letrozole)  Today's Dose: 2628  Total Dose for Breast: 5256  Today's Fraction/Total Fraction Breast: 10/20  Drainage: 0: Absent                                   Sexuality Alteration                    Emotional Alteration    Copin: Effective  Comfort Alteration   KPS: 100 % Normal, no complaints  Fatigue (ONS scale): 0: No Fatigue  Pain Location: left breast  Pain Intensity. Rate degree of pain ranging from 0 (no pain) to 10 (severe pain): 2  Pain Description: Ache - Muscular type ache   Nutrition Alteration   Anorexia: 0: None  Nausea: 0: None  Vomitin: None  Weight: 85.5 kg (188 lb 6.4 oz)  Skin Alteration   Skin Sensation: 0: No problem  Skin Reaction: 1: Faint erythema or dry desquamation  AUA Assessment                                           Accompanied by       Objective:     Exam: moderate Erythema.    Vitals:    24 0934   BP: (!) 143/82   Pulse: 82   Resp: 20   Temp: 98.5  F (36.9  C)   SpO2: 99%   Weight: 85.5  kg (188 lb 6.4 oz)   PainSc: Mild Pain (2)   PainLoc: Breast       Wt Readings from Last 8 Encounters:   05/23/24 85.5 kg (188 lb 6.4 oz)   05/15/24 84.6 kg (186 lb 9.6 oz)   05/08/24 87.5 kg (193 lb)   04/11/24 85.8 kg (189 lb 3.2 oz)       General: Alert and oriented, in no acute distress  Marlyn has moderate Erythema.  Aria chart and setup information reviewed    Jackie Hickey MD

## 2024-05-24 ENCOUNTER — APPOINTMENT (OUTPATIENT)
Dept: RADIATION ONCOLOGY | Facility: HOSPITAL | Age: 54
End: 2024-05-24
Attending: RADIOLOGY
Payer: COMMERCIAL

## 2024-05-24 PROCEDURE — 77387 GUIDANCE FOR RADJ TX DLVR: CPT | Performed by: RADIOLOGY

## 2024-05-24 PROCEDURE — 77412 RADIATION TX DELIVERY LVL 3: CPT | Performed by: RADIOLOGY

## 2024-05-28 ENCOUNTER — APPOINTMENT (OUTPATIENT)
Dept: RADIATION ONCOLOGY | Facility: HOSPITAL | Age: 54
End: 2024-05-28
Attending: RADIOLOGY
Payer: COMMERCIAL

## 2024-05-28 PROCEDURE — 77387 GUIDANCE FOR RADJ TX DLVR: CPT | Performed by: RADIOLOGY

## 2024-05-28 PROCEDURE — 77412 RADIATION TX DELIVERY LVL 3: CPT | Performed by: RADIOLOGY

## 2024-05-29 ENCOUNTER — APPOINTMENT (OUTPATIENT)
Dept: RADIATION ONCOLOGY | Facility: CLINIC | Age: 54
End: 2024-05-29
Attending: RADIOLOGY
Payer: COMMERCIAL

## 2024-05-29 VITALS
RESPIRATION RATE: 18 BRPM | SYSTOLIC BLOOD PRESSURE: 121 MMHG | BODY MASS INDEX: 28.42 KG/M2 | DIASTOLIC BLOOD PRESSURE: 66 MMHG | TEMPERATURE: 98.2 F | WEIGHT: 189.7 LBS | OXYGEN SATURATION: 98 % | HEART RATE: 74 BPM

## 2024-05-29 DIAGNOSIS — Z17.0 MALIGNANT NEOPLASM OF UPPER-OUTER QUADRANT OF LEFT BREAST IN FEMALE, ESTROGEN RECEPTOR POSITIVE (H): Primary | ICD-10-CM

## 2024-05-29 DIAGNOSIS — C50.412 MALIGNANT NEOPLASM OF UPPER-OUTER QUADRANT OF LEFT BREAST IN FEMALE, ESTROGEN RECEPTOR POSITIVE (H): Primary | ICD-10-CM

## 2024-05-29 PROCEDURE — 77387 GUIDANCE FOR RADJ TX DLVR: CPT | Performed by: RADIOLOGY

## 2024-05-29 PROCEDURE — 77412 RADIATION TX DELIVERY LVL 3: CPT | Performed by: RADIOLOGY

## 2024-05-29 ASSESSMENT — PAIN SCALES - GENERAL: PAINLEVEL: NO PAIN (0)

## 2024-05-29 NOTE — PROGRESS NOTES
RADIATION ONCOLOGY WEEKLY TREATMENT VISIT NOTE      Assessment / Impression       Visit Dx:  (C50.412,  Z17.0) Malignant neoplasm of upper-outer quadrant of left breast in female, estrogen receptor positive (H)  (primary encounter diagnosis)     Tolerating radiation therapy well.  All questions and concerns addressed.    Plan:     Continue radiation treatment as prescribed.    Discussed with patient about skin care.    Subjective:      HPI: Marlyn Martel is a 54 year old female with  Malignant neoplasm of upper-outer quadrant of left breast in female, estrogen receptor positive (H) [C50.412, Z17.0]    The following portions of the patient's history were reviewed and updated as appropriate: allergies, current medications, past family history, past medical history, past social history, past surgical history and problem list.    Assessment                  Body Site:  Breast                           Site: Left Brest  Stereotactic Radiosurgery: No  Concurrent Therapy: Yes (letrozole)  Today's Dose: 3410  Total Dose for Breast: 5256  Today's Fraction/Total Fraction Breast:   Drainage: 0: Absent                                   Sexuality Alteration                    Emotional Alteration    Copin: Effective  Comfort Alteration   KPS: 100 % Normal, no complaints  Fatigue (ONS scale): 0: No Fatigue;2: Mild Fatigue  Pain Location: denies   Nutrition Alteration   Anorexia: 0: None  Nausea: 0: None  Vomitin: None  Weight: 86 kg (189 lb 11.2 oz)  Skin Alteration   Skin Sensation: 0: No problem  Skin Reaction: 1: Faint erythema or dry desquamation (skin intact)  AUA Assessment                                           Accompanied by       Objective:     Exam: mild, moderate Erythema.    Vitals:    24 1013   BP: 121/66   Pulse: 74   Resp: 18   Temp: 98.2  F (36.8  C)   SpO2: 98%   Weight: 86 kg (189 lb 11.2 oz)   PainSc: No Pain (0)       Wt Readings from Last 8 Encounters:   24 86 kg (189  lb 11.2 oz)   05/23/24 85.5 kg (188 lb 6.4 oz)   05/15/24 84.6 kg (186 lb 9.6 oz)   05/08/24 87.5 kg (193 lb)   04/11/24 85.8 kg (189 lb 3.2 oz)       General: Alert and oriented, in no acute distress  Marlyn has mild, moderate Erythema.  Aria chart and setup information reviewed    Jackie Hickey MD

## 2024-05-29 NOTE — LETTER
2024         RE: Marlyn Martel  [address]  [place] [address]        Dear Colleague,    Thank you for referring your patient, Marlyn Martel, to the [facility] RADIATION ONCOLOGY [place]. Please see a copy of my visit note below.    RADIATION ONCOLOGY WEEKLY TREATMENT VISIT NOTE      Assessment / Impression       Visit Dx:  (C50.412,  Z17.0) Malignant neoplasm of upper-outer quadrant of left breast in female, estrogen receptor positive (H)  (primary encounter diagnosis)     Tolerating radiation therapy well.  All questions and concerns addressed.    Plan:     Continue radiation treatment as prescribed.    Discussed with patient about skin care.    Subjective:      HPI: Marlyn Martel is a 54 year old female with  Malignant neoplasm of upper-outer quadrant of left breast in female, estrogen receptor positive (H) [C50.412, Z17.0]    The following portions of the patient's history were reviewed and updated as appropriate: allergies, current medications, past family history, past medical history, past social history, past surgical history and problem list.    Assessment                  Body Site:  Breast                           Site: Left Brest  Stereotactic Radiosurgery: No  Concurrent Therapy: Yes (letrozole)  Today's Dose: 3410  Total Dose for Breast: 5256  Today's Fraction/Total Fraction Breast:   Drainage: 0: Absent                                   Sexuality Alteration                    Emotional Alteration    Copin: Effective  Comfort Alteration   KPS: 100 % Normal, no complaints  Fatigue (ONS scale): 0: No Fatigue;2: Mild Fatigue  Pain Location: denies   Nutrition Alteration   Anorexia: 0: None  Nausea: 0: None  Vomitin: None  Weight: 86 kg (189 lb 11.2 oz)  Skin Alteration   Skin Sensation: 0: No problem  Skin Reaction: 1: Faint erythema or dry desquamation (skin intact)  AUA Assessment                                           Accompanied by        Objective:     Exam: mild, moderate Erythema.    Vitals:    05/29/24 1013   BP: 121/66   Pulse: 74   Resp: 18   Temp: 98.2  F (36.8  C)   SpO2: 98%   Weight: 86 kg (189 lb 11.2 oz)   PainSc: No Pain (0)       Wt Readings from Last 8 Encounters:   05/29/24 86 kg (189 lb 11.2 oz)   05/23/24 85.5 kg (188 lb 6.4 oz)   05/15/24 84.6 kg (186 lb 9.6 oz)   05/08/24 87.5 kg (193 lb)   04/11/24 85.8 kg (189 lb 3.2 oz)       General: Alert and oriented, in no acute distress  Marlyn has mild, moderate Erythema.  Aria chart and setup information reviewed    Jackie Hickey MD      Again, thank you for allowing me to participate in the care of your patient.        Sincerely,        Jackie Hickey MD

## 2024-05-30 ENCOUNTER — APPOINTMENT (OUTPATIENT)
Dept: RADIATION ONCOLOGY | Facility: CLINIC | Age: 54
End: 2024-05-30
Attending: RADIOLOGY
Payer: COMMERCIAL

## 2024-05-30 PROCEDURE — 77387 GUIDANCE FOR RADJ TX DLVR: CPT | Performed by: STUDENT IN AN ORGANIZED HEALTH CARE EDUCATION/TRAINING PROGRAM

## 2024-05-30 PROCEDURE — 77412 RADIATION TX DELIVERY LVL 3: CPT | Performed by: STUDENT IN AN ORGANIZED HEALTH CARE EDUCATION/TRAINING PROGRAM

## 2024-05-31 ENCOUNTER — APPOINTMENT (OUTPATIENT)
Dept: RADIATION ONCOLOGY | Facility: CLINIC | Age: 54
End: 2024-05-31
Attending: RADIOLOGY
Payer: COMMERCIAL

## 2024-05-31 PROCEDURE — 77427 RADIATION TX MANAGEMENT X5: CPT | Performed by: RADIOLOGY

## 2024-05-31 PROCEDURE — 77387 GUIDANCE FOR RADJ TX DLVR: CPT | Performed by: STUDENT IN AN ORGANIZED HEALTH CARE EDUCATION/TRAINING PROGRAM

## 2024-05-31 PROCEDURE — 77336 RADIATION PHYSICS CONSULT: CPT | Performed by: RADIOLOGY

## 2024-05-31 PROCEDURE — 77412 RADIATION TX DELIVERY LVL 3: CPT | Performed by: STUDENT IN AN ORGANIZED HEALTH CARE EDUCATION/TRAINING PROGRAM

## 2024-06-03 ENCOUNTER — APPOINTMENT (OUTPATIENT)
Dept: RADIATION ONCOLOGY | Facility: CLINIC | Age: 54
End: 2024-06-03
Attending: RADIOLOGY
Payer: COMMERCIAL

## 2024-06-03 PROCEDURE — 77387 GUIDANCE FOR RADJ TX DLVR: CPT | Performed by: RADIOLOGY

## 2024-06-03 PROCEDURE — 77412 RADIATION TX DELIVERY LVL 3: CPT | Performed by: RADIOLOGY

## 2024-06-04 ENCOUNTER — APPOINTMENT (OUTPATIENT)
Dept: RADIATION ONCOLOGY | Facility: CLINIC | Age: 54
End: 2024-06-04
Attending: RADIOLOGY
Payer: COMMERCIAL

## 2024-06-04 PROCEDURE — 77412 RADIATION TX DELIVERY LVL 3: CPT

## 2024-06-04 PROCEDURE — 77387 GUIDANCE FOR RADJ TX DLVR: CPT | Performed by: RADIOLOGY

## 2024-06-04 PROCEDURE — 77387 GUIDANCE FOR RADJ TX DLVR: CPT

## 2024-06-05 ENCOUNTER — OFFICE VISIT (OUTPATIENT)
Dept: RADIATION ONCOLOGY | Facility: CLINIC | Age: 54
End: 2024-06-05
Attending: RADIOLOGY
Payer: COMMERCIAL

## 2024-06-05 VITALS
WEIGHT: 189.3 LBS | BODY MASS INDEX: 28.36 KG/M2 | HEART RATE: 84 BPM | SYSTOLIC BLOOD PRESSURE: 149 MMHG | TEMPERATURE: 98.2 F | DIASTOLIC BLOOD PRESSURE: 77 MMHG | OXYGEN SATURATION: 99 % | RESPIRATION RATE: 16 BRPM

## 2024-06-05 DIAGNOSIS — Z17.0 MALIGNANT NEOPLASM OF UPPER-OUTER QUADRANT OF LEFT BREAST IN FEMALE, ESTROGEN RECEPTOR POSITIVE (H): Primary | ICD-10-CM

## 2024-06-05 DIAGNOSIS — C50.412 MALIGNANT NEOPLASM OF UPPER-OUTER QUADRANT OF LEFT BREAST IN FEMALE, ESTROGEN RECEPTOR POSITIVE (H): Primary | ICD-10-CM

## 2024-06-05 PROCEDURE — 77387 GUIDANCE FOR RADJ TX DLVR: CPT | Performed by: RADIOLOGY

## 2024-06-05 PROCEDURE — 77412 RADIATION TX DELIVERY LVL 3: CPT | Performed by: RADIOLOGY

## 2024-06-05 ASSESSMENT — PAIN SCALES - GENERAL: PAINLEVEL: MILD PAIN (2)

## 2024-06-05 NOTE — LETTER
2024      Marlyn Martel  [address]  [place] [address]      Dear Colleague,    Thank you for referring your patient, Marlyn Martel, to the [facility] RADIATION ONCOLOGY [place]. Please see a copy of my visit note below.    RADIATION ONCOLOGY WEEKLY TREATMENT VISIT NOTE      Assessment / Impression       Visit Dx:  (C50.412,  Z17.0) Malignant neoplasm of upper-outer quadrant of left breast in female, estrogen receptor positive (H)  (primary encounter diagnosis)     Tolerating radiation therapy well.  All questions and concerns addressed.    Plan:     Continue radiation treatment as prescribed.    Discussed with the patient about skin care.    Subjective:      HPI: Marlyn Martel is a 54 year old female with  Malignant neoplasm of upper-outer quadrant of left breast in female, estrogen receptor positive (H) [C50.412, Z17.0]    The following portions of the patient's history were reviewed and updated as appropriate: allergies, current medications, past family history, past medical history, past social history, past surgical history and problem list.    Assessment                  Body Site:  Breast                           Site: Lt. Breast  Stereotactic Radiosurgery: No  Concurrent Therapy: Yes (letrozole)  Today's Dose: 4724  Total Dose for Breast: 5256  Today's Fraction/Total Fraction Breast: 18/20  Drainage: 0: Absent                                   Sexuality Alteration                    Emotional Alteration    Copin: Effective  Comfort Alteration   KPS: 100 % Normal, no complaints  Fatigue (ONS scale): 0: No Fatigue;2: Mild Fatigue  Pain Location: left breast  Pain Intensity. Rate degree of pain ranging from 0 (no pain) to 10 (severe pain): 2  Pain Description: Ache - Muscular type ache  Pain Intervention: 1: Over the counter medications  Effectiveness of pain intervention: 0: No relief   Nutrition Alteration   Anorexia: 0: None  Nausea: 0: None  Vomitin:  None  Weight: 85.9 kg (189 lb 4.8 oz)  Skin Alteration   Skin Sensation: 0: No problem;2: Burning  Skin Reaction: 2: Bright erythema (meplex lite and radia care gel sheets given with instructions)  AUA Assessment                                           Accompanied by       Objective:     Exam: moderate Erythema.    Vitals:    06/05/24 0952   BP: (!) 149/77   Pulse: 84   Resp: 16   Temp: 98.2  F (36.8  C)   TempSrc: Oral   SpO2: 99%   Weight: 85.9 kg (189 lb 4.8 oz)   PainSc: Mild Pain (2)   PainLoc: Breast       Wt Readings from Last 8 Encounters:   06/05/24 85.9 kg (189 lb 4.8 oz)   05/29/24 86 kg (189 lb 11.2 oz)   05/23/24 85.5 kg (188 lb 6.4 oz)   05/15/24 84.6 kg (186 lb 9.6 oz)   05/08/24 87.5 kg (193 lb)   04/11/24 85.8 kg (189 lb 3.2 oz)       General: Alert and oriented, in no acute distress  Marlyn has moderate Erythema.  Aria chart and setup information reviewed    Jackie Hickey MD      Again, thank you for allowing me to participate in the care of your patient.        Sincerely,        Jackie Hickey MD

## 2024-06-05 NOTE — PROGRESS NOTES
RADIATION ONCOLOGY WEEKLY TREATMENT VISIT NOTE      Assessment / Impression       Visit Dx:  (C50.412,  Z17.0) Malignant neoplasm of upper-outer quadrant of left breast in female, estrogen receptor positive (H)  (primary encounter diagnosis)     Tolerating radiation therapy well.  All questions and concerns addressed.    Plan:     Continue radiation treatment as prescribed.    Discussed with the patient about skin care.    Subjective:      HPI: Marlyn Martel is a 54 year old female with  Malignant neoplasm of upper-outer quadrant of left breast in female, estrogen receptor positive (H) [C50.412, Z17.0]    The following portions of the patient's history were reviewed and updated as appropriate: allergies, current medications, past family history, past medical history, past social history, past surgical history and problem list.    Assessment                  Body Site:  Breast                           Site: Lt. Breast  Stereotactic Radiosurgery: No  Concurrent Therapy: Yes (letrozole)  Today's Dose: 4724  Total Dose for Breast: 5256  Today's Fraction/Total Fraction Breast: 18/20  Drainage: 0: Absent                                   Sexuality Alteration                    Emotional Alteration    Copin: Effective  Comfort Alteration   KPS: 100 % Normal, no complaints  Fatigue (ONS scale): 0: No Fatigue;2: Mild Fatigue  Pain Location: left breast  Pain Intensity. Rate degree of pain ranging from 0 (no pain) to 10 (severe pain): 2  Pain Description: Ache - Muscular type ache  Pain Intervention: 1: Over the counter medications  Effectiveness of pain intervention: 0: No relief   Nutrition Alteration   Anorexia: 0: None  Nausea: 0: None  Vomitin: None  Weight: 85.9 kg (189 lb 4.8 oz)  Skin Alteration   Skin Sensation: 0: No problem;2: Burning  Skin Reaction: 2: Bright erythema (meplex lite and radia care gel sheets given with instructions)  AUA Assessment                                            Accompanied by       Objective:     Exam: moderate Erythema.    Vitals:    06/05/24 0952   BP: (!) 149/77   Pulse: 84   Resp: 16   Temp: 98.2  F (36.8  C)   TempSrc: Oral   SpO2: 99%   Weight: 85.9 kg (189 lb 4.8 oz)   PainSc: Mild Pain (2)   PainLoc: Breast       Wt Readings from Last 8 Encounters:   06/05/24 85.9 kg (189 lb 4.8 oz)   05/29/24 86 kg (189 lb 11.2 oz)   05/23/24 85.5 kg (188 lb 6.4 oz)   05/15/24 84.6 kg (186 lb 9.6 oz)   05/08/24 87.5 kg (193 lb)   04/11/24 85.8 kg (189 lb 3.2 oz)       General: Alert and oriented, in no acute distress  Marlyn has moderate Erythema.  Aria chart and setup information reviewed    Jackie Hickey MD

## 2024-06-06 ENCOUNTER — APPOINTMENT (OUTPATIENT)
Dept: RADIATION ONCOLOGY | Facility: CLINIC | Age: 54
End: 2024-06-06
Attending: RADIOLOGY
Payer: COMMERCIAL

## 2024-06-06 PROCEDURE — 77412 RADIATION TX DELIVERY LVL 3: CPT | Performed by: RADIOLOGY

## 2024-06-06 PROCEDURE — 77387 GUIDANCE FOR RADJ TX DLVR: CPT | Performed by: RADIOLOGY

## 2024-06-07 ENCOUNTER — ALLIED HEALTH/NURSE VISIT (OUTPATIENT)
Dept: RADIATION ONCOLOGY | Facility: CLINIC | Age: 54
End: 2024-06-07
Attending: RADIOLOGY
Payer: COMMERCIAL

## 2024-06-07 PROCEDURE — 77412 RADIATION TX DELIVERY LVL 3: CPT | Performed by: RADIOLOGY

## 2024-06-07 PROCEDURE — 77387 GUIDANCE FOR RADJ TX DLVR: CPT | Performed by: RADIOLOGY

## 2024-06-07 PROCEDURE — 77427 RADIATION TX MANAGEMENT X5: CPT | Performed by: RADIOLOGY

## 2024-06-07 PROCEDURE — 77336 RADIATION PHYSICS CONSULT: CPT | Performed by: RADIOLOGY

## 2024-06-07 NOTE — PROGRESS NOTES
Patient here ambulatory for final radiation therapy treatment for her breast cancer.  Bright erythema present in treatment area.  Reinforced skin cares and gave additional mepilex lite and radia care gel sheets today.  Written discharge instructions reviewed and given to patient.  Follow up with Dr. Hickey in about 4 to 6 weeks.  Patient left ambulatory and asked to stop at  to make appointments before leaving clinic.

## 2024-06-07 NOTE — PROGRESS NOTES
Radiation Treatment Summary          Patient: Marlyn Martel            MRN: [id number]           : 1970        Care Provider: Jackie Hickey MD         Date of Service: 2024        Ghazal Keane MD  [id number] [facility] [address]  [place] [address]           Dear Dr. Keane:     Your patient Mrs. Marlyn Martel completed her radiation therapy on 2024. As you know Ms. Martel is a 54 year old female with a diagnosis of left breast cancer, pathologic stage [id number])M0, ER/TX positive, HER2 negative, status post lumpectomy and sentinel lymph node resection with negative margin and closest margin measured 1 mm posterior for invasive cancer and a 5 mm posterior for DCIS.  1/2 removed sentinel lymph node showed 0.23 millimeter invasive cancer with no evidence of YUSUF.  Oncotype DX score is 28.  The patient received postop radiation therapy with a total dose of 5256 centigrade in 20 treatments targeted to the left breast given from 2024 - 2024.  She tolerated radiation therapy well with expected acute side effect.  The patient is scheduled to return to radiation oncology in 4 weeks for routine post therapy office follow-up.    Again, thank you very much for the referral and allowing me to participate in the care of this patient.  If you have any questions or concerns about this patient, please do not hesitate to call.          Sincerely,    Jackie Hickey MD, PhD  Department of Radiation Oncology   [facility] Radiation Oncology  Cell: [phone]    [facility]  [address] [place] [address]     [place] [address] [facility]   [place] [address]    CC:  Patient Care Team:  No Ref-Primary, Physician as PCP - General  Jackie Hickey MD as MD (Radiation Oncology)  Jackie Hickey MD as Assigned Cancer Care Provider

## 2024-06-17 ENCOUNTER — TELEPHONE (OUTPATIENT)
Dept: RADIATION ONCOLOGY | Facility: CLINIC | Age: 54
End: 2024-06-17
Payer: COMMERCIAL

## 2024-06-17 NOTE — TELEPHONE ENCOUNTER
Called pt at this time for a routine post radiation follow up call. I had to leave a message; told pt there is no need to call us back if they are feeling fine, but if they do have any questions or concerns to please call.  phone number provided and asked pt to please call as she doesn't have a follow up apt yet.     Paola Melendez RN  [facility]

## 2024-07-19 ENCOUNTER — MEDICAL CORRESPONDENCE (OUTPATIENT)
Dept: HEALTH INFORMATION MANAGEMENT | Facility: CLINIC | Age: 54
End: 2024-07-19
Payer: COMMERCIAL

## 2024-07-19 ENCOUNTER — LAB REQUISITION (OUTPATIENT)
Dept: LAB | Facility: CLINIC | Age: 54
End: 2024-07-19
Payer: COMMERCIAL

## 2024-07-19 ENCOUNTER — TRANSFERRED RECORDS (OUTPATIENT)
Dept: HEALTH INFORMATION MANAGEMENT | Facility: CLINIC | Age: 54
End: 2024-07-19
Payer: COMMERCIAL

## 2024-07-19 DIAGNOSIS — E78.2 MIXED HYPERLIPIDEMIA: ICD-10-CM

## 2024-07-19 PROCEDURE — 80053 COMPREHEN METABOLIC PANEL: CPT | Performed by: FAMILY MEDICINE

## 2024-07-19 PROCEDURE — 80061 LIPID PANEL: CPT | Performed by: FAMILY MEDICINE

## 2024-07-20 LAB
ALBUMIN SERPL BCG-MCNC: 4.5 G/DL (ref 3.5–5.2)
ALP SERPL-CCNC: 88 U/L (ref 40–150)
ALT SERPL W P-5'-P-CCNC: 29 U/L (ref 0–50)
ANION GAP SERPL CALCULATED.3IONS-SCNC: 10 MMOL/L (ref 7–15)
AST SERPL W P-5'-P-CCNC: 25 U/L (ref 0–45)
BILIRUB SERPL-MCNC: 0.3 MG/DL
BUN SERPL-MCNC: 8.6 MG/DL (ref 6–20)
CALCIUM SERPL-MCNC: 10.1 MG/DL (ref 8.8–10.4)
CHLORIDE SERPL-SCNC: 98 MMOL/L (ref 98–107)
CHOLEST SERPL-MCNC: 125 MG/DL
CREAT SERPL-MCNC: 0.84 MG/DL (ref 0.51–0.95)
EGFRCR SERPLBLD CKD-EPI 2021: 82 ML/MIN/1.73M2
FASTING STATUS PATIENT QL REPORTED: ABNORMAL
FASTING STATUS PATIENT QL REPORTED: ABNORMAL
GLUCOSE SERPL-MCNC: 167 MG/DL (ref 70–99)
HCO3 SERPL-SCNC: 28 MMOL/L (ref 22–29)
HDLC SERPL-MCNC: 42 MG/DL
LDLC SERPL CALC-MCNC: 62 MG/DL
NONHDLC SERPL-MCNC: 83 MG/DL
POTASSIUM SERPL-SCNC: 4.6 MMOL/L (ref 3.4–5.3)
PROT SERPL-MCNC: 7.7 G/DL (ref 6.4–8.3)
SODIUM SERPL-SCNC: 136 MMOL/L (ref 135–145)
TRIGL SERPL-MCNC: 107 MG/DL

## 2024-07-22 ENCOUNTER — TRANSCRIBE ORDERS (OUTPATIENT)
Dept: OTHER | Age: 54
End: 2024-07-22

## 2024-07-22 ENCOUNTER — LAB REQUISITION (OUTPATIENT)
Dept: LAB | Facility: CLINIC | Age: 54
End: 2024-07-22
Payer: COMMERCIAL

## 2024-07-22 DIAGNOSIS — E78.2 MIXED HYPERLIPIDEMIA: Primary | ICD-10-CM

## 2024-07-22 DIAGNOSIS — E78.2 MIXED HYPERLIPIDEMIA: ICD-10-CM

## 2024-08-29 ENCOUNTER — OFFICE VISIT (OUTPATIENT)
Dept: CARDIOLOGY | Facility: CLINIC | Age: 54
End: 2024-08-29
Payer: COMMERCIAL

## 2024-08-29 VITALS
BODY MASS INDEX: 29.15 KG/M2 | OXYGEN SATURATION: 96 % | TEMPERATURE: 98.5 F | RESPIRATION RATE: 16 BRPM | WEIGHT: 192.3 LBS | HEART RATE: 89 BPM | SYSTOLIC BLOOD PRESSURE: 143 MMHG | HEIGHT: 68 IN | DIASTOLIC BLOOD PRESSURE: 84 MMHG

## 2024-08-29 DIAGNOSIS — E78.2 MIXED HYPERLIPIDEMIA: ICD-10-CM

## 2024-08-29 DIAGNOSIS — I10 HYPERTENSION, UNSPECIFIED TYPE: Primary | ICD-10-CM

## 2024-08-29 DIAGNOSIS — R06.00 DYSPNEA, UNSPECIFIED TYPE: ICD-10-CM

## 2024-08-29 DIAGNOSIS — R68.89 DECREASED EXERCISE TOLERANCE: ICD-10-CM

## 2024-08-29 DIAGNOSIS — I25.10 CORONARY ARTERY DISEASE INVOLVING NATIVE CORONARY ARTERY OF NATIVE HEART WITHOUT ANGINA PECTORIS: ICD-10-CM

## 2024-08-29 PROCEDURE — 99204 OFFICE O/P NEW MOD 45 MIN: CPT | Performed by: INTERNAL MEDICINE

## 2024-08-29 RX ORDER — METOPROLOL SUCCINATE 50 MG/1
75 TABLET, EXTENDED RELEASE ORAL DAILY
Qty: 135 TABLET | Refills: 3 | Status: SHIPPED | OUTPATIENT
Start: 2024-08-29

## 2024-08-29 RX ORDER — EXEMESTANE 25 MG/1
1 TABLET ORAL
COMMUNITY
Start: 2024-07-08

## 2024-08-29 RX ORDER — HYDROCODONE BITARTRATE AND ACETAMINOPHEN 5; 325 MG/1; MG/1
TABLET ORAL
COMMUNITY
Start: 2024-06-25 | End: 2024-08-29

## 2024-08-29 RX ORDER — ASPIRIN 81 MG/1
81 TABLET ORAL DAILY
Status: SHIPPED
Start: 2024-08-29

## 2024-08-29 NOTE — PROGRESS NOTES
[facility]   [address], [place] [address]   [email or web address]   OFFICE: [phone]          Thank you Dr. Zuniga for asking the [facility] team to participate in the care of your patient, Marlyn Martel.     Impression and Plan     1.  Coronary artery disease.  Marlyn has known coronary artery disease by virtue of CT coronary calcium score 8 April 2024 revealing a total Agatston score is 1235 which places her at the 99th percentile for age/gender.    Marlyn minimizes any actual chest discomfort though he has noted some subjective diminution in exercise tolerance/dyspnea with certain activities which she in part thinks may be related to recent treatments for her recent diagnosis of breast cancer.  Do feel, however, is reasonable and prudent to rule out any ischemic contribution given her inordinately high calcium score.  She states that with her recent treatments she has had some noticeable joint issues and feels she may not adequately be able to exercise on treadmill and therefore will need none exercise perfusion imaging.  Plan:  Regadenoson stress MRI.  Marlyn states that she has had MRIs in the past and does not have any issues with claustrophobia and the like.    2.  Systolic murmur.  Marlyn is noted to have a systolic murmur heard at right sternal border, but also in a/like distribution.  Stress MRI as per problem #1 which will also allow for us to evaluate for any underlying valvular heart disease.    3.  Hypertension.  Blood pressure has been trending somewhat high as of late.  She is on a trivial dose of lisinopril though also on losartan at 100 mg daily.  Given she is on losartan feel this obviates the need for her to be on very low-dose lisinopril and instead would advocate increasing her metoprolol further.  Plan:  Continue amlodipine 10 mg daily.  Continue losartan 100 mg daily.  Discontinue lisinopril 2.5 mg  "daily.  Continue metoprolol succinate, but increase from 50 mg daily to 75 mg daily.    4.  Dyslipidemia.  Lipid profile 19 July 2024 revealed LDL 62 mg/dL and HDL 42 mg/dL.  Continue atorvastatin 40 mg daily.    Follow-up and further recommendations pending test results.    35 minutes spent reviewing prior records (including documentation, laboratory studies, cardiac testing/imaging), interview with patient along with physical exam, planning, and subsequent documentation/crafting of note).           History of Present Illness    Once again I would like to thank you again for asking me to participate in the care of your patient, Marlyn Martel.  As you know, but to reiterate for my own records, Marlyn Martel is a 54 year old female with score is 1235 which places her at the 99th percentile for age/gender.    Further review of systems is otherwise negative/noncontributory (medical record and 13 point review of systems reviewed as well and pertinent positives noted).         Cardiac Diagnostics      Nuclear perfusion imaging study 11 November 2013:  No evidence of infarct or ischemia.  Normal left ventricular systolic performance with ejection fraction of 65%.    CT coronary calcium score 8 April 2024:  The total Agatston score is 1235 placing the individual in the 99th percentile when compared to an age and gender matched control group and implies a very high risk of cardiac events in the next ten years.  Left main coronary artery: 0  Left anterior descending coronary: 555  Circumflex coronary: 82  Right coronary artery 598           Physical Examination       BP (!) 143/84 (BP Location: Right arm, Patient Position: Sitting, Cuff Size: Adult Regular)   Pulse 89   Temp 98.5  F (36.9  C) (Oral)   Resp 16   Ht 1.727 m (5' 8\")   Wt 87.2 kg (192 lb 4.8 oz)   SpO2 96%   BMI 29.24 kg/m          Wt Readings from Last 3 Encounters:   08/29/24 87.2 kg (192 lb 4.8 oz)   06/05/24 85.9 kg (189 lb 4.8 oz)   05/29/24 86 " kg (189 lb 11.2 oz)       The patient is alert and oriented times three. Sclerae are anicteric. Mucosal membranes are moist. Jugular venous pressure is normal. No significant adenopathy/thyromegally appreciated. Lungs are clear with good expansion. On cardiovascular exam, the patient has a regular S1 and S2.  There is a 2/6 systolic murmur heard at right sternal border and also in a/like distribution.  Abdomen is soft and non-tender. Extremities reveal no clubbing, cyanosis, or edema.         Family History/Social History/Risk Factors   Patient does not smoke.  Family history reviewed, and family history includes Breast Cancer in her maternal aunt and mother; Cancer in her maternal aunt and sister; Colon Cancer in her maternal grandmother; Esophageal Cancer in her maternal uncle; Leukemia in her paternal grandfather; Liver Disease in her father and paternal uncle.          Medical History  Surgical History Family History Social History   Past Medical History:   Diagnosis Date    Breast cancer (H)     Depression     Hypertension     Mixed hyperlipidemia      Past Surgical History:   Procedure Laterality Date    HYSTERECTOMY      age 29    LUMPECTOMY BREAST Left 03/18/2024    NV REDUCTION OF LARGE BREAST Bilateral      Family History   Problem Relation Age of Onset    Breast Cancer Mother     Liver Disease Father     Colon Cancer Maternal Grandmother     Leukemia Paternal Grandfather     Cancer Sister     Breast Cancer Maternal Aunt     Cancer Maternal Aunt     Liver Disease Paternal Uncle     Esophageal Cancer Maternal Uncle         Social History     Socioeconomic History    Marital status:      Spouse name: Not on file    Number of children: Not on file    Years of education: Not on file    Highest education level: Not on file   Occupational History    Not on file   Tobacco Use    Smoking status: Every Day     Current packs/day: 0.50     Types: Cigarettes    Smokeless tobacco: Never   Vaping Use    Vaping  status: Never Used   Substance and Sexual Activity    Alcohol use: Yes     Comment: holidays    Drug use: Never    Sexual activity: Not on file   Other Topics Concern    Not on file   Social History Narrative    Not on file     Social Determinants of Health     Financial Resource Strain: High Risk (1/1/2022)    Received from [facility], [facility]    Financial Resource Strain     Difficulty of Paying Living Expenses: Not on file     Difficulty of Paying Living Expenses: Not on file   Food Insecurity: Not on file   Transportation Needs: Not on file   Physical Activity: Not on file   Stress: Not on file   Social Connections: Unknown (3/27/2024)    Received from [facility], [facility] [employer] [facility]    Social Connections     Frequency of Communication with Friends and Family: Not on file   Interpersonal Safety: Not on file   Housing Stability: Not on file           Medications  Allergies   Current Outpatient Medications   Medication Sig Dispense Refill    acetaminophen (TYLENOL) 500 MG tablet Take 1,000 mg by mouth.      amLODIPine (NORVASC) 10 MG tablet Take 10 mg by mouth daily      aspirin 81 MG EC tablet Take 1 tablet (81 mg) by mouth daily.      atorvastatin (LIPITOR) 40 MG tablet Take 1 tablet by mouth at bedtime      azelaic acid (FINACIA) 15 % external gel Apply topically 2 times daily      buPROPion (WELLBUTRIN XL) 150 MG 24 hr tablet Take 150 mg by mouth every morning      Continuous Glucose Sensor (FREESTYLE KENY 3 SENSOR) MISC       exemestane (AROMASIN) 25 MG tablet Take 1 tablet by mouth.      letrozole (FEMARA) 2.5 MG tablet Take 2.5 mg by mouth daily      losartan (COZAAR) 100 MG tablet Take 1 tablet by mouth daily      metFORMIN modified (GLUMETZA) 1000 MG 24 hr tablet Take 1,000 mg by mouth daily      metoprolol succinate ER (TOPROL XL) 50 MG 24 hr tablet Take 1.5 tablets (75 mg)  "by mouth daily. 135 tablet 3    Multiple Vitamins-Minerals (MULTIVITAL PO) Take by mouth daily      simvastatin (ZOCOR) 40 MG tablet 40 mg      SOOLANTRA 1 % cream Apply topically daily      LORazepam (ATIVAN) 0.5 MG tablet          Allergies   Allergen Reactions    Beta Adrenergic Blockers Swelling     edema    Pneumococcal Vaccine Swelling    Sulfa Antibiotics           Lab Results    Chemistry/lipid CBC Cardiac Enzymes/BNP/TSH/INR   Recent Labs   Lab Test 07/19/24  1222   CHOL 125   HDL 42*   LDL 62   TRIG 107     Recent Labs   Lab Test 07/19/24  1222 02/27/24  0915 09/19/23  1559   LDL 62 160* 102*     Recent Labs   Lab Test 07/19/24  1222      POTASSIUM 4.6   CHLORIDE 98   CO2 28   *   BUN 8.6   CR 0.84   GFRESTIMATED 82   VARUN 10.1     Recent Labs   Lab Test 07/19/24  1222 02/27/24  0915 09/19/23  1559   CR 0.84 0.81 0.83     No results for input(s): \"A1C\" in the last 75648 hours.       Recent Labs   Lab Test 02/27/24  0915   WBC 6.0   HGB 15.0   HCT 43.3   MCV 97        Recent Labs   Lab Test 02/27/24  0915 06/14/23  0809   HGB 15.0 14.0    No results for input(s): \"TROPONINI\" in the last 64839 hours.  No results for input(s): \"BNP\", \"NTBNPI\", \"NTBNP\" in the last 83688 hours.  Recent Labs   Lab Test 06/14/23  0809   TSH 1.29     No results for input(s): \"INR\" in the last 91222 hours.       Medications  Allergies   Current Outpatient Medications   Medication Sig Dispense Refill    acetaminophen (TYLENOL) 500 MG tablet Take 1,000 mg by mouth.      amLODIPine (NORVASC) 10 MG tablet Take 10 mg by mouth daily      aspirin 81 MG EC tablet Take 1 tablet (81 mg) by mouth daily.      atorvastatin (LIPITOR) 40 MG tablet Take 1 tablet by mouth at bedtime      azelaic acid (FINACIA) 15 % external gel Apply topically 2 times daily      buPROPion (WELLBUTRIN XL) 150 MG 24 hr tablet Take 150 mg by mouth every morning      Continuous Glucose Sensor (FREESTYLE KENY 3 SENSOR) MISC       exemestane " "(AROMASIN) 25 MG tablet Take 1 tablet by mouth.      letrozole (FEMARA) 2.5 MG tablet Take 2.5 mg by mouth daily      losartan (COZAAR) 100 MG tablet Take 1 tablet by mouth daily      metFORMIN modified (GLUMETZA) 1000 MG 24 hr tablet Take 1,000 mg by mouth daily      metoprolol succinate ER (TOPROL XL) 50 MG 24 hr tablet Take 1.5 tablets (75 mg) by mouth daily. 135 tablet 3    Multiple Vitamins-Minerals (MULTIVITAL PO) Take by mouth daily      simvastatin (ZOCOR) 40 MG tablet 40 mg      SOOLANTRA 1 % cream Apply topically daily      LORazepam (ATIVAN) 0.5 MG tablet         Allergies   Allergen Reactions    Beta Adrenergic Blockers Swelling     edema    Pneumococcal Vaccine Swelling    Sulfa Antibiotics           Lab Results   Lab Results   Component Value Date     07/19/2024    CO2 28 07/19/2024    CO2 28 06/15/2022    BUN 8.6 07/19/2024    BUN 6 06/15/2022     Lab Results   Component Value Date    WBC 6.0 02/27/2024    HGB 15.0 02/27/2024    HCT 43.3 02/27/2024    MCV 97 02/27/2024     02/27/2024     Lab Results   Component Value Date    CHOL 125 07/19/2024    TRIG 107 07/19/2024    HDL 42 07/19/2024     No results found for: \"INR\"  No results found for: \"BNP\"  No results found for: \"CKTOTAL\", \"CKMB\", \"TROPONINI\"  Lab Results   Component Value Date    TSH 1.29 06/14/2023    TSH 1.87 07/02/2020                  "

## 2024-08-29 NOTE — LETTER
8/29/2024    Soham Zuniga MD  [address]  [place] [address]    RE: Marlyn GEETA Delonte       Dear Colleague,     I had the pleasure of seeing Marlyn Martel in the [facility].         [facility]   [address], [place] [address]   [email or web address]   OFFICE: [phone]          Thank you Dr. Zuniga for asking the [facility] team to participate in the care of your patient, Marlyn Martel.     Impression and Plan     1.  Coronary artery disease.  Marlyn has known coronary artery disease by virtue of CT coronary calcium score 8 April 2024 revealing a total Agatston score is 1235 which places her at the 99th percentile for age/gender.    Marlyn minimizes any actual chest discomfort though he has noted some subjective diminution in exercise tolerance/dyspnea with certain activities which she in part thinks may be related to recent treatments for her recent diagnosis of breast cancer.  Do feel, however, is reasonable and prudent to rule out any ischemic contribution given her inordinately high calcium score.  She states that with her recent treatments she has had some noticeable joint issues and feels she may not adequately be able to exercise on treadmill and therefore will need none exercise perfusion imaging.  Plan:  Regadenoson stress MRI.  Marlyn states that she has had MRIs in the past and does not have any issues with claustrophobia and the like.    2.  Systolic murmur.  Marlyn is noted to have a systolic murmur heard at right sternal border, but also in a/like distribution.  Stress MRI as per problem #1 which will also allow for us to evaluate for any underlying valvular heart disease.    3.  Hypertension.  Blood pressure has been trending somewhat high as of late.  She is on a trivial dose of lisinopril though also on losartan at 100 mg daily.  Given she is on losartan feel this obviates the need for  her to be on very low-dose lisinopril and instead would advocate increasing her metoprolol further.  Plan:  Continue amlodipine 10 mg daily.  Continue losartan 100 mg daily.  Discontinue lisinopril 2.5 mg daily.  Continue metoprolol succinate, but increase from 50 mg daily to 75 mg daily.    4.  Dyslipidemia.  Lipid profile 19 July 2024 revealed LDL 62 mg/dL and HDL 42 mg/dL.  Continue atorvastatin 40 mg daily.    Follow-up and further recommendations pending test results.    35 minutes spent reviewing prior records (including documentation, laboratory studies, cardiac testing/imaging), interview with patient along with physical exam, planning, and subsequent documentation/crafting of note).           History of Present Illness    Once again I would like to thank you again for asking me to participate in the care of your patient, Marlyn Martel.  As you know, but to reiterate for my own records, Marlyn Martel is a 54 year old female with score is 1235 which places her at the 99th percentile for age/gender.    Further review of systems is otherwise negative/noncontributory (medical record and 13 point review of systems reviewed as well and pertinent positives noted).         Cardiac Diagnostics      Nuclear perfusion imaging study 11 November 2013:  No evidence of infarct or ischemia.  Normal left ventricular systolic performance with ejection fraction of 65%.    CT coronary calcium score 8 April 2024:  The total Agatston score is 1235 placing the individual in the 99th percentile when compared to an age and gender matched control group and implies a very high risk of cardiac events in the next ten years.  Left main coronary artery: 0  Left anterior descending coronary: 555  Circumflex coronary: 82  Right coronary artery 598           Physical Examination       BP (!) 143/84 (BP Location: Right arm, Patient Position: Sitting, Cuff Size: Adult Regular)   Pulse 89   Temp 98.5  F (36.9  C) (Oral)   Resp 16   Ht  "1.727 m (5' 8\")   Wt 87.2 kg (192 lb 4.8 oz)   SpO2 96%   BMI 29.24 kg/m          Wt Readings from Last 3 Encounters:   08/29/24 87.2 kg (192 lb 4.8 oz)   06/05/24 85.9 kg (189 lb 4.8 oz)   05/29/24 86 kg (189 lb 11.2 oz)       The patient is alert and oriented times three. Sclerae are anicteric. Mucosal membranes are moist. Jugular venous pressure is normal. No significant adenopathy/thyromegally appreciated. Lungs are clear with good expansion. On cardiovascular exam, the patient has a regular S1 and S2.  There is a 2/6 systolic murmur heard at right sternal border and also in a/like distribution.  Abdomen is soft and non-tender. Extremities reveal no clubbing, cyanosis, or edema.         Family History/Social History/Risk Factors   Patient does not smoke.  Family history reviewed, and family history includes Breast Cancer in her maternal aunt and mother; Cancer in her maternal aunt and sister; Colon Cancer in her maternal grandmother; Esophageal Cancer in her maternal uncle; Leukemia in her paternal grandfather; Liver Disease in her father and paternal uncle.          Medical History  Surgical History Family History Social History   Past Medical History:   Diagnosis Date     Breast cancer (H)      Depression      Hypertension      Mixed hyperlipidemia      Past Surgical History:   Procedure Laterality Date     HYSTERECTOMY      age 29     LUMPECTOMY BREAST Left 03/18/2024     IL REDUCTION OF LARGE BREAST Bilateral      Family History   Problem Relation Age of Onset     Breast Cancer Mother      Liver Disease Father      Colon Cancer Maternal Grandmother      Leukemia Paternal Grandfather      Cancer Sister      Breast Cancer Maternal Aunt      Cancer Maternal Aunt      Liver Disease Paternal Uncle      Esophageal Cancer Maternal Uncle         Social History     Socioeconomic History     Marital status:      Spouse name: Not on file     Number of children: Not on file     Years of education: Not on file "     Highest education level: Not on file   Occupational History     Not on file   Tobacco Use     Smoking status: Every Day     Current packs/day: 0.50     Types: Cigarettes     Smokeless tobacco: Never   Vaping Use     Vaping status: Never Used   Substance and Sexual Activity     Alcohol use: Yes     Comment: holidays     Drug use: Never     Sexual activity: Not on file   Other Topics Concern     Not on file   Social History Narrative     Not on file     Social Determinants of Health     Financial Resource Strain: High Risk (1/1/2022)    [facility]    Financial Resource Strain      Difficulty of Paying Living Expenses: Not on file      Difficulty of Paying Living Expenses: Not on file   Food Insecurity: Not on file   Transportation Needs: Not on file   Physical Activity: Not on file   Stress: Not on file   Social Connections: Unknown (3/27/2024)    Received from [employer] [facility], [facility]    Social Connections      Frequency of Communication with Friends and Family: Not on file   Interpersonal Safety: Not on file   Housing Stability: Not on file           Medications  Allergies   Current Outpatient Medications   Medication Sig Dispense Refill     acetaminophen (TYLENOL) 500 MG tablet Take 1,000 mg by mouth.       amLODIPine (NORVASC) 10 MG tablet Take 10 mg by mouth daily       aspirin 81 MG EC tablet Take 1 tablet (81 mg) by mouth daily.       atorvastatin (LIPITOR) 40 MG tablet Take 1 tablet by mouth at bedtime       azelaic acid (FINACIA) 15 % external gel Apply topically 2 times daily       buPROPion (WELLBUTRIN XL) 150 MG 24 hr tablet Take 150 mg by mouth every morning       Continuous Glucose Sensor (FREESTYLE KENY 3 SENSOR) MISC        exemestane (AROMASIN) 25 MG tablet Take 1 tablet by mouth.       letrozole (FEMARA) 2.5 MG tablet Take 2.5 mg by mouth  "daily       losartan (COZAAR) 100 MG tablet Take 1 tablet by mouth daily       metFORMIN modified (GLUMETZA) 1000 MG 24 hr tablet Take 1,000 mg by mouth daily       metoprolol succinate ER (TOPROL XL) 50 MG 24 hr tablet Take 1.5 tablets (75 mg) by mouth daily. 135 tablet 3     Multiple Vitamins-Minerals (MULTIVITAL PO) Take by mouth daily       simvastatin (ZOCOR) 40 MG tablet 40 mg       SOOLANTRA 1 % cream Apply topically daily       LORazepam (ATIVAN) 0.5 MG tablet          Allergies   Allergen Reactions     Beta Adrenergic Blockers Swelling     edema     Pneumococcal Vaccine Swelling     Sulfa Antibiotics           Lab Results    Chemistry/lipid CBC Cardiac Enzymes/BNP/TSH/INR   Recent Labs   Lab Test 07/19/24  1222   CHOL 125   HDL 42*   LDL 62   TRIG 107     Recent Labs   Lab Test 07/19/24  1222 02/27/24  0915 09/19/23  1559   LDL 62 160* 102*     Recent Labs   Lab Test 07/19/24  1222      POTASSIUM 4.6   CHLORIDE 98   CO2 28   *   BUN 8.6   CR 0.84   GFRESTIMATED 82   VARUN 10.1     Recent Labs   Lab Test 07/19/24  1222 02/27/24  0915 09/19/23  1559   CR 0.84 0.81 0.83     No results for input(s): \"A1C\" in the last 61434 hours.       Recent Labs   Lab Test 02/27/24  0915   WBC 6.0   HGB 15.0   HCT 43.3   MCV 97        Recent Labs   Lab Test 02/27/24  0915 06/14/23  0809   HGB 15.0 14.0    No results for input(s): \"TROPONINI\" in the last 46636 hours.  No results for input(s): \"BNP\", \"NTBNPI\", \"NTBNP\" in the last 37377 hours.  Recent Labs   Lab Test 06/14/23  0809   TSH 1.29     No results for input(s): \"INR\" in the last 12231 hours.       Medications  Allergies   Current Outpatient Medications   Medication Sig Dispense Refill     acetaminophen (TYLENOL) 500 MG tablet Take 1,000 mg by mouth.       amLODIPine (NORVASC) 10 MG tablet Take 10 mg by mouth daily       aspirin 81 MG EC tablet Take 1 tablet (81 mg) by mouth daily.       atorvastatin (LIPITOR) 40 MG tablet Take 1 tablet by mouth at " "bedtime       azelaic acid (FINACIA) 15 % external gel Apply topically 2 times daily       buPROPion (WELLBUTRIN XL) 150 MG 24 hr tablet Take 150 mg by mouth every morning       Continuous Glucose Sensor (FREESTYLE KENY 3 SENSOR) MISC        exemestane (AROMASIN) 25 MG tablet Take 1 tablet by mouth.       letrozole (FEMARA) 2.5 MG tablet Take 2.5 mg by mouth daily       losartan (COZAAR) 100 MG tablet Take 1 tablet by mouth daily       metFORMIN modified (GLUMETZA) 1000 MG 24 hr tablet Take 1,000 mg by mouth daily       metoprolol succinate ER (TOPROL XL) 50 MG 24 hr tablet Take 1.5 tablets (75 mg) by mouth daily. 135 tablet 3     Multiple Vitamins-Minerals (MULTIVITAL PO) Take by mouth daily       simvastatin (ZOCOR) 40 MG tablet 40 mg       SOOLANTRA 1 % cream Apply topically daily       LORazepam (ATIVAN) 0.5 MG tablet         Allergies   Allergen Reactions     Beta Adrenergic Blockers Swelling     edema     Pneumococcal Vaccine Swelling     Sulfa Antibiotics           Lab Results   Lab Results   Component Value Date     07/19/2024    CO2 28 07/19/2024    CO2 28 06/15/2022    BUN 8.6 07/19/2024    BUN 6 06/15/2022     Lab Results   Component Value Date    WBC 6.0 02/27/2024    HGB 15.0 02/27/2024    HCT 43.3 02/27/2024    MCV 97 02/27/2024     02/27/2024     Lab Results   Component Value Date    CHOL 125 07/19/2024    TRIG 107 07/19/2024    HDL 42 07/19/2024     No results found for: \"INR\"  No results found for: \"BNP\"  No results found for: \"CKTOTAL\", \"CKMB\", \"TROPONINI\"  Lab Results   Component Value Date    TSH 1.29 06/14/2023    TSH 1.87 07/02/2020                    Thank you for allowing me to participate in the care of your patient.      Sincerely,     Melanie Bose MD     [facility]  cc:   Soham Zuniga MD  [id number] [place] [address]      "

## 2024-09-24 ENCOUNTER — HOSPITAL ENCOUNTER (OUTPATIENT)
Dept: MRI IMAGING | Facility: HOSPITAL | Age: 54
Discharge: HOME | End: 2024-09-24
Attending: INTERNAL MEDICINE
Payer: COMMERCIAL

## 2024-09-24 VITALS — SYSTOLIC BLOOD PRESSURE: 136 MMHG | OXYGEN SATURATION: 93 % | HEART RATE: 93 BPM | DIASTOLIC BLOOD PRESSURE: 77 MMHG

## 2024-09-24 DIAGNOSIS — I10 HYPERTENSION, UNSPECIFIED TYPE: ICD-10-CM

## 2024-09-24 DIAGNOSIS — E78.2 MIXED HYPERLIPIDEMIA: ICD-10-CM

## 2024-09-24 DIAGNOSIS — I25.10 CORONARY ARTERY DISEASE INVOLVING NATIVE CORONARY ARTERY OF NATIVE HEART WITHOUT ANGINA PECTORIS: ICD-10-CM

## 2024-09-24 LAB
ATRIAL RATE - MUSE: 85 BPM
ATRIAL RATE - MUSE: 90 BPM
DIASTOLIC BLOOD PRESSURE - MUSE: NORMAL MMHG
DIASTOLIC BLOOD PRESSURE - MUSE: NORMAL MMHG
INTERPRETATION ECG - MUSE: NORMAL
INTERPRETATION ECG - MUSE: NORMAL
P AXIS - MUSE: 53 DEGREES
P AXIS - MUSE: 58 DEGREES
PR INTERVAL - MUSE: 130 MS
PR INTERVAL - MUSE: 136 MS
QRS DURATION - MUSE: 84 MS
QRS DURATION - MUSE: 88 MS
QT - MUSE: 372 MS
QT - MUSE: 380 MS
QTC - MUSE: 452 MS
QTC - MUSE: 455 MS
R AXIS - MUSE: 54 DEGREES
R AXIS - MUSE: 59 DEGREES
SYSTOLIC BLOOD PRESSURE - MUSE: NORMAL MMHG
SYSTOLIC BLOOD PRESSURE - MUSE: NORMAL MMHG
T AXIS - MUSE: 48 DEGREES
T AXIS - MUSE: 51 DEGREES
VENTRICULAR RATE- MUSE: 85 BPM
VENTRICULAR RATE- MUSE: 90 BPM

## 2024-09-24 PROCEDURE — 250N000011 HC RX IP 250 OP 636: Performed by: INTERNAL MEDICINE

## 2024-09-24 PROCEDURE — 93010 ELECTROCARDIOGRAM REPORT: CPT | Mod: 76 | Performed by: INTERNAL MEDICINE

## 2024-09-24 PROCEDURE — 999N000122 MR MYOCARDIUM  OVERREAD

## 2024-09-24 PROCEDURE — 93005 ELECTROCARDIOGRAM TRACING: CPT

## 2024-09-24 PROCEDURE — 75563 CARD MRI W/STRESS IMG & DYE: CPT

## 2024-09-24 PROCEDURE — 93016 CV STRESS TEST SUPVJ ONLY: CPT | Performed by: INTERNAL MEDICINE

## 2024-09-24 PROCEDURE — 255N000002 HC RX 255 OP 636: Performed by: INTERNAL MEDICINE

## 2024-09-24 PROCEDURE — 75563 CARD MRI W/STRESS IMG & DYE: CPT | Mod: 26 | Performed by: INTERNAL MEDICINE

## 2024-09-24 PROCEDURE — A9585 GADOBUTROL INJECTION: HCPCS | Performed by: INTERNAL MEDICINE

## 2024-09-24 PROCEDURE — 93018 CV STRESS TEST I&R ONLY: CPT | Performed by: INTERNAL MEDICINE

## 2024-09-24 RX ORDER — REGADENOSON 0.08 MG/ML
0.4 INJECTION, SOLUTION INTRAVENOUS ONCE
Status: COMPLETED | OUTPATIENT
Start: 2024-09-24 | End: 2024-09-24

## 2024-09-24 RX ORDER — CAFFEINE 200 MG
200 TABLET ORAL
Status: DISCONTINUED | OUTPATIENT
Start: 2024-09-24 | End: 2024-09-24 | Stop reason: HOSPADM

## 2024-09-24 RX ORDER — GADOBUTROL 604.72 MG/ML
18 INJECTION INTRAVENOUS ONCE
Status: COMPLETED | OUTPATIENT
Start: 2024-09-24 | End: 2024-09-24

## 2024-09-24 RX ORDER — ALBUTEROL SULFATE 0.83 MG/ML
2.5 SOLUTION RESPIRATORY (INHALATION)
Status: DISCONTINUED | OUTPATIENT
Start: 2024-09-24 | End: 2024-09-24 | Stop reason: HOSPADM

## 2024-09-24 RX ORDER — CAFFEINE CITRATE 20 MG/ML
60 SOLUTION INTRAVENOUS
Status: DISCONTINUED | OUTPATIENT
Start: 2024-09-24 | End: 2024-09-24 | Stop reason: HOSPADM

## 2024-09-24 RX ORDER — AMINOPHYLLINE 25 MG/ML
50-100 INJECTION, SOLUTION INTRAVENOUS
Status: DISCONTINUED | OUTPATIENT
Start: 2024-09-24 | End: 2024-09-25 | Stop reason: HOSPADM

## 2024-09-24 RX ADMIN — REGADENOSON 0.4 MG: 0.08 INJECTION, SOLUTION INTRAVENOUS at 12:06

## 2024-09-24 RX ADMIN — GADOBUTROL 18 ML: 604.72 INJECTION INTRAVENOUS at 12:38

## 2024-09-25 DIAGNOSIS — I10 HYPERTENSION, UNSPECIFIED TYPE: ICD-10-CM

## 2024-09-25 DIAGNOSIS — I25.10 CORONARY ARTERY DISEASE INVOLVING NATIVE CORONARY ARTERY OF NATIVE HEART WITHOUT ANGINA PECTORIS: ICD-10-CM

## 2024-09-25 DIAGNOSIS — E78.2 MIXED HYPERLIPIDEMIA: Primary | ICD-10-CM

## 2024-09-29 ENCOUNTER — HEALTH MAINTENANCE LETTER (OUTPATIENT)
Age: 54
End: 2024-09-29

## 2024-10-01 ENCOUNTER — ANCILLARY PROCEDURE (OUTPATIENT)
Dept: MAMMOGRAPHY | Facility: HOSPITAL | Age: 54
End: 2024-10-01
Attending: FAMILY MEDICINE
Payer: COMMERCIAL

## 2024-10-01 DIAGNOSIS — Z12.31 VISIT FOR SCREENING MAMMOGRAM: ICD-10-CM

## 2024-10-01 PROCEDURE — 77063 BREAST TOMOSYNTHESIS BI: CPT

## 2025-01-18 ENCOUNTER — HEALTH MAINTENANCE LETTER (OUTPATIENT)
Age: 55
End: 2025-01-18

## 2025-04-27 ENCOUNTER — HEALTH MAINTENANCE LETTER (OUTPATIENT)
Age: 55
End: 2025-04-27

## 2025-06-08 ENCOUNTER — HEALTH MAINTENANCE LETTER (OUTPATIENT)
Age: 55
End: 2025-06-08

## 2025-07-05 DIAGNOSIS — I10 HYPERTENSION, UNSPECIFIED TYPE: Primary | ICD-10-CM

## 2025-07-07 RX ORDER — METOPROLOL SUCCINATE 25 MG/1
TABLET, EXTENDED RELEASE ORAL
Qty: 90 TABLET | Refills: 0 | Status: SHIPPED | OUTPATIENT
Start: 2025-07-07

## 2025-08-10 ENCOUNTER — HEALTH MAINTENANCE LETTER (OUTPATIENT)
Age: 55
End: 2025-08-10